# Patient Record
Sex: FEMALE | Race: BLACK OR AFRICAN AMERICAN | NOT HISPANIC OR LATINO
[De-identification: names, ages, dates, MRNs, and addresses within clinical notes are randomized per-mention and may not be internally consistent; named-entity substitution may affect disease eponyms.]

---

## 2017-09-11 ENCOUNTER — APPOINTMENT (OUTPATIENT)
Dept: ORTHOPEDIC SURGERY | Facility: CLINIC | Age: 57
End: 2017-09-11
Payer: COMMERCIAL

## 2017-09-11 VITALS
HEIGHT: 62.5 IN | BODY MASS INDEX: 31.4 KG/M2 | WEIGHT: 175 LBS | SYSTOLIC BLOOD PRESSURE: 114 MMHG | DIASTOLIC BLOOD PRESSURE: 76 MMHG | HEART RATE: 69 BPM

## 2017-09-11 PROCEDURE — 72110 X-RAY EXAM L-2 SPINE 4/>VWS: CPT

## 2017-09-11 PROCEDURE — 99214 OFFICE O/P EST MOD 30 MIN: CPT

## 2017-09-11 PROCEDURE — 72170 X-RAY EXAM OF PELVIS: CPT | Mod: 59

## 2019-02-17 ENCOUNTER — TRANSCRIPTION ENCOUNTER (OUTPATIENT)
Age: 59
End: 2019-02-17

## 2019-07-30 ENCOUNTER — EMERGENCY (EMERGENCY)
Facility: HOSPITAL | Age: 59
LOS: 0 days | Discharge: HOME | End: 2019-07-30
Attending: EMERGENCY MEDICINE
Payer: COMMERCIAL

## 2019-07-30 VITALS
WEIGHT: 177.91 LBS | TEMPERATURE: 98 F | RESPIRATION RATE: 15 BRPM | DIASTOLIC BLOOD PRESSURE: 73 MMHG | HEIGHT: 62 IN | OXYGEN SATURATION: 99 % | SYSTOLIC BLOOD PRESSURE: 122 MMHG | HEART RATE: 71 BPM

## 2019-07-30 VITALS
DIASTOLIC BLOOD PRESSURE: 71 MMHG | RESPIRATION RATE: 19 BRPM | SYSTOLIC BLOOD PRESSURE: 119 MMHG | HEART RATE: 61 BPM | TEMPERATURE: 98 F | OXYGEN SATURATION: 96 %

## 2019-07-30 DIAGNOSIS — R00.2 PALPITATIONS: ICD-10-CM

## 2019-07-30 DIAGNOSIS — Z90.49 ACQUIRED ABSENCE OF OTHER SPECIFIED PARTS OF DIGESTIVE TRACT: Chronic | ICD-10-CM

## 2019-07-30 LAB
ALBUMIN SERPL ELPH-MCNC: 3.8 G/DL — SIGNIFICANT CHANGE UP (ref 3.3–5)
ALP SERPL-CCNC: 65 U/L — SIGNIFICANT CHANGE UP (ref 40–120)
ALT FLD-CCNC: 20 U/L — SIGNIFICANT CHANGE UP (ref 12–78)
ANION GAP SERPL CALC-SCNC: 5 MMOL/L — SIGNIFICANT CHANGE UP (ref 5–17)
APTT BLD: 28.7 SEC — SIGNIFICANT CHANGE UP (ref 28.5–37)
AST SERPL-CCNC: 15 U/L — SIGNIFICANT CHANGE UP (ref 15–37)
BILIRUB SERPL-MCNC: 0.4 MG/DL — SIGNIFICANT CHANGE UP (ref 0.2–1.2)
BUN SERPL-MCNC: 11 MG/DL — SIGNIFICANT CHANGE UP (ref 7–23)
CALCIUM SERPL-MCNC: 9.1 MG/DL — SIGNIFICANT CHANGE UP (ref 8.5–10.1)
CHLORIDE SERPL-SCNC: 107 MMOL/L — SIGNIFICANT CHANGE UP (ref 96–108)
CO2 SERPL-SCNC: 29 MMOL/L — SIGNIFICANT CHANGE UP (ref 22–31)
CREAT SERPL-MCNC: 0.91 MG/DL — SIGNIFICANT CHANGE UP (ref 0.5–1.3)
D DIMER BLD IA.RAPID-MCNC: <150 NG/ML DDU — SIGNIFICANT CHANGE UP
GLUCOSE SERPL-MCNC: 87 MG/DL — SIGNIFICANT CHANGE UP (ref 70–99)
HCT VFR BLD CALC: 46.6 % — HIGH (ref 34.5–45)
HGB BLD-MCNC: 14.5 G/DL — SIGNIFICANT CHANGE UP (ref 11.5–15.5)
INR BLD: 0.99 RATIO — SIGNIFICANT CHANGE UP (ref 0.88–1.16)
MAGNESIUM SERPL-MCNC: 2.8 MG/DL — HIGH (ref 1.6–2.6)
MCHC RBC-ENTMCNC: 25.3 PG — LOW (ref 27–34)
MCHC RBC-ENTMCNC: 31.1 GM/DL — LOW (ref 32–36)
MCV RBC AUTO: 81.5 FL — SIGNIFICANT CHANGE UP (ref 80–100)
NRBC # BLD: 0 /100 WBCS — SIGNIFICANT CHANGE UP (ref 0–0)
PLATELET # BLD AUTO: 248 K/UL — SIGNIFICANT CHANGE UP (ref 150–400)
POTASSIUM SERPL-MCNC: 4.3 MMOL/L — SIGNIFICANT CHANGE UP (ref 3.5–5.3)
POTASSIUM SERPL-SCNC: 4.3 MMOL/L — SIGNIFICANT CHANGE UP (ref 3.5–5.3)
PROT SERPL-MCNC: 7.6 GM/DL — SIGNIFICANT CHANGE UP (ref 6–8.3)
PROTHROM AB SERPL-ACNC: 11.1 SEC — SIGNIFICANT CHANGE UP (ref 10–12.9)
RBC # BLD: 5.72 M/UL — HIGH (ref 3.8–5.2)
RBC # FLD: 14.6 % — HIGH (ref 10.3–14.5)
SODIUM SERPL-SCNC: 141 MMOL/L — SIGNIFICANT CHANGE UP (ref 135–145)
TROPONIN I SERPL-MCNC: <.015 NG/ML — SIGNIFICANT CHANGE UP (ref 0.01–0.04)
TROPONIN I SERPL-MCNC: <.015 NG/ML — SIGNIFICANT CHANGE UP (ref 0.01–0.04)
TSH SERPL-MCNC: 1.04 UIU/ML — SIGNIFICANT CHANGE UP (ref 0.36–3.74)
WBC # BLD: 7.41 K/UL — SIGNIFICANT CHANGE UP (ref 3.8–10.5)
WBC # FLD AUTO: 7.41 K/UL — SIGNIFICANT CHANGE UP (ref 3.8–10.5)

## 2019-07-30 PROCEDURE — 93010 ELECTROCARDIOGRAM REPORT: CPT | Mod: 76

## 2019-07-30 PROCEDURE — 71045 X-RAY EXAM CHEST 1 VIEW: CPT | Mod: 26

## 2019-07-30 PROCEDURE — 99285 EMERGENCY DEPT VISIT HI MDM: CPT

## 2019-07-30 RX ORDER — ASPIRIN/CALCIUM CARB/MAGNESIUM 324 MG
325 TABLET ORAL ONCE
Refills: 0 | Status: COMPLETED | OUTPATIENT
Start: 2019-07-30 | End: 2019-07-30

## 2019-07-30 RX ADMIN — Medication 325 MILLIGRAM(S): at 16:56

## 2019-07-30 NOTE — ED ADULT TRIAGE NOTE - CHIEF COMPLAINT QUOTE
palpitation sometime my chest  tight. 2 weeks ago she had these symptoms and she was seen by the doctor schedule for test tomorrow. She already had the heart monitor the results are pending. Today 1 pm she started feeling it again chest tightness and palpitations.

## 2019-07-30 NOTE — ED ADULT NURSE NOTE - CHPI ED NUR SYMPTOMS NEG
no fever/no back pain/no shortness of breath/no nausea/no syncope/no chest pain/no chills/no vomiting/no diaphoresis/no dizziness

## 2019-07-30 NOTE — ED ADULT NURSE NOTE - OBJECTIVE STATEMENT
c/o palpitation and chest  tightness  2 weeks ago which resolved.  Had appointment with MD tomorrow however chest tightness returned this morning.  Patient states she has had hematuria for years.  Denies SOB/fever/N/V/dysuria/diarrhea.   Denies PMH

## 2019-07-30 NOTE — ED PROVIDER NOTE - PHYSICAL EXAMINATION
Gen: Alert, Well appearing. NAD    Head: NC, AT, PERRL, normal lids/conjunctiva   ENT: Bilateral TM WNL, patent oropharynx without erythema/exudate, uvula midline  Neck: supple, no tenderness/meningismus  Pulm: Bilateral clear BS, normal resp effort  CV: RRR, no M/R/G, +dist pulses   Abd: soft, NT/ND, +BS, no guarding/rebound tenderness  Mskel:  no edema/erythema/cyanosis   Skin: no rash, no bruising  Neuro: AAOx3, no sensory/motor deficits, CN 2-12 intact

## 2019-07-30 NOTE — ED ADULT NURSE NOTE - NSIMPLEMENTINTERV_GEN_ALL_ED
Implemented All Universal Safety Interventions:  [place] to call system. Call bell, personal items and telephone within reach. Instruct patient to call for assistance. Room bathroom lighting operational. Non-slip footwear when patient is off stretcher. Physically safe environment: no spills, clutter or unnecessary equipment. Stretcher in lowest position, wheels locked, appropriate side rails in place.

## 2019-07-30 NOTE — ED PROVIDER NOTE - CLINICAL SUMMARY MEDICAL DECISION MAKING FREE TEXT BOX
pt nontoxic, well appearing, has appt for stress test tomorrow. pt on monitor without any arrythmias.

## 2020-01-15 ENCOUNTER — APPOINTMENT (OUTPATIENT)
Dept: ORTHOPEDIC SURGERY | Facility: CLINIC | Age: 60
End: 2020-01-15
Payer: COMMERCIAL

## 2020-01-15 VITALS
HEIGHT: 62.5 IN | WEIGHT: 176 LBS | HEART RATE: 67 BPM | BODY MASS INDEX: 31.58 KG/M2 | SYSTOLIC BLOOD PRESSURE: 114 MMHG | DIASTOLIC BLOOD PRESSURE: 74 MMHG

## 2020-01-15 DIAGNOSIS — M54.42 LUMBAGO WITH SCIATICA, LEFT SIDE: ICD-10-CM

## 2020-01-15 PROCEDURE — 72110 X-RAY EXAM L-2 SPINE 4/>VWS: CPT

## 2020-01-15 PROCEDURE — 72170 X-RAY EXAM OF PELVIS: CPT | Mod: 59

## 2020-01-15 PROCEDURE — 99214 OFFICE O/P EST MOD 30 MIN: CPT

## 2020-01-15 NOTE — PHYSICAL EXAM
[Normal] : normal [Limited] : is limited [LE] : 5/5 motor strength in bilateral lower extremities [Knee] : patellar 2+ and symmetric bilaterally [Ankle] : ankle 2+ and symmetric bilaterally [PT] : posterior tibial 2+ and symmetric bilaterally [DP] : dorsalis pedis 2+ and symmetric bilaterally [Poor Appearance] : well-appearing [Obese] : not obese [Acute Distress] : not in acute distress [Poor Coordination] : normal coordination [Abl Affect] : with normal affect [Abl Mood] : in a normal mood [Disorientation] : oriented x 3 [Painful] : not painful [SLR] : negative straight leg raise [FreeTextEntry2] : The pt is awake, alert and oriented to self, place and time, is uncomfortable and in no acute distress. Gait examination reveals a narrow based, non-ataxic, non-antalgic gait. Can heel and toe walk without difficulty. Inspection of neck, back and lower extremities bilaterally reveals no rashes or ecchymotic lesions.  There is no obvious abnormal spinal curvature in the sagittal and coronal planes. There is no tenderness over the cervical, thoracic  spine, or the upper and lower extremities musculature. Tenderness noted in the midline lumbar spine as well as Left paraspinal lumbar musculature. There is no sacroiliac tenderness. No greater trochanteric tenderness bilaterally. No atrophy or abnormal movements noted in the upper or lower extremities. There is no swelling noted in the upper or lower extremities bilaterally. No cervical lymphadenopathy noted anteriorly. No joint laxity noted in the upper and lower extremity joints bilaterally.\par There is no groin pain with hip internal rotation and a negative HILARY test bilaterally.  [de-identified] : healed lumbar incision [de-identified] : d extension of 30 degrees [de-identified] : 4 lumbar spine obtained today demonstrate no significant scoliosis. Mild degenerative changes noted in the sacroiliac joint right more than left. On the lateral projection grade 1 anterolisthesis at L4-5. Minimal loss of disc space and L5-S1. No dynamic instability noted between flexion and extension. No acute fractures identified.\par \par AP pelvis x-ray obtained today demonstrates normal appearance of the hips bilaterally. No acute fracture noted. No significant degenerative changes identified. Mild bilateral sacroiliac degeneration noted.\par \par No significant interval change since Xrays 2016

## 2020-01-15 NOTE — DISCUSSION/SUMMARY
[Medication Risks Reviewed] : Medication risks reviewed [de-identified] : The patient at this time reports acute onset of back pain with minimal left lumbar radiculopathy. This has been ongoing for 2 weeks. In the absence of any specific neurologic deficits there are no nerve tension signs I recommended physical therapy for her. Prescribed her baclofen and recommended use of ibuprofen 600 mg which she has at home. I will see her back in 4 weeks for reevaluation. Her symptoms persist or worsen MRI lumbar spine can be considered.\par \par The patient was educated regarding their condition, treatment options as well as prescribed course of treatment. \par Risks and benefits as well as alternatives to the proposed treatment were also provided to the patient \par They were given the opportunity to have all their questions answered to their satisfaction.\par \par Vital signs were reviewed with the patient and the patient was instructed to followup with their primary care provider for further management.\par \par Healthy lifestyle recommendations were also made including a tobacco free lifestyle, proper diet, and weight control.

## 2020-01-15 NOTE — HISTORY OF PRESENT ILLNESS
[Stable] : stable [5] : a current pain level of 5/10 [Daily] : ~He/She~ states the symptoms seem to be occuring daily [de-identified] : any type of activity [de-identified] : Patient is here today due to acute flare up in her left sided low back intermittent left leg pain started 2 weeks ago no injury and not medically treated for this episode. 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  [de-identified] : motrin 600mg

## 2020-01-15 NOTE — REASON FOR VISIT
[Herniated Discs] : herniated discs [Follow-Up Visit] : a follow-up visit for [Back Pain] : back pain [Radiculopathy] : radiculopathy [Sciatica] : sciatica [FreeTextEntry2] : Lumbar microdiskectomy 7/20/16

## 2020-05-28 ENCOUNTER — APPOINTMENT (OUTPATIENT)
Dept: PULMONOLOGY | Facility: CLINIC | Age: 60
End: 2020-05-28
Payer: COMMERCIAL

## 2020-05-28 VITALS
WEIGHT: 169 LBS | BODY MASS INDEX: 30.32 KG/M2 | RESPIRATION RATE: 18 BRPM | DIASTOLIC BLOOD PRESSURE: 81 MMHG | HEART RATE: 68 BPM | OXYGEN SATURATION: 98 % | SYSTOLIC BLOOD PRESSURE: 119 MMHG | HEIGHT: 62.5 IN | TEMPERATURE: 98.5 F

## 2020-05-28 PROCEDURE — 71046 X-RAY EXAM CHEST 2 VIEWS: CPT

## 2020-05-28 PROCEDURE — 36415 COLL VENOUS BLD VENIPUNCTURE: CPT

## 2020-05-28 PROCEDURE — 99244 OFF/OP CNSLTJ NEW/EST MOD 40: CPT | Mod: 25

## 2020-05-30 NOTE — REASON FOR VISIT
[Consultation] : a consultation [Chest Pain] : chest pain [Cough] : cough [Shortness of Breath] : shortness of breath

## 2020-05-30 NOTE — HISTORY OF PRESENT ILLNESS
[TextBox_4] : Dee is a pleasant 59-year-old female with a history of lumbar radiculopathy, status post lumbar spine laminectomy, she has been experiencing whitish productive cough for the last 2 months, also has shortness of breath and mild pleuritic chest pain, she states that she was diagnosed with COVID 19 infection in March of this year

## 2020-05-30 NOTE — ASSESSMENT
[FreeTextEntry1] : Venipuncture with labs drawn in office\par I am starting her on Breo 200/25 mcg Ellipta, one puff QD

## 2020-05-30 NOTE — CONSULT LETTER
[Dear  ___] : Dear  [unfilled], [Courtesy Letter:] : I had the pleasure of seeing your patient, [unfilled], in my office today. [Please see my note below.] : Please see my note below. [Referral Closing:] : Thank you very much for seeing this patient.  If you have any questions, please do not hesitate to contact me. [Sincerely,] : Sincerely, [FreeTextEntry3] : Dr. Karoline Mauricio

## 2020-05-30 NOTE — DISCUSSION/SUMMARY
[FreeTextEntry1] : Dee is a  patient with persistent cough and shortness of breath positive secondary to asthmatic bronchitis from the recent COVID 19 infection, rule out PE (unlikely)

## 2020-06-01 ENCOUNTER — TRANSCRIPTION ENCOUNTER (OUTPATIENT)
Age: 60
End: 2020-06-01

## 2020-06-02 LAB
ALBUMIN SERPL ELPH-MCNC: 4.1 G/DL
ALP BLD-CCNC: 63 U/L
ALT SERPL-CCNC: 14 U/L
ANION GAP SERPL CALC-SCNC: 12 MMOL/L
AST SERPL-CCNC: 22 U/L
BASOPHILS # BLD AUTO: 0.07 K/UL
BASOPHILS NFR BLD AUTO: 1.2 %
BILIRUB SERPL-MCNC: 0.4 MG/DL
BUN SERPL-MCNC: 15 MG/DL
CALCIUM SERPL-MCNC: 9.5 MG/DL
CHLORIDE SERPL-SCNC: 103 MMOL/L
CO2 SERPL-SCNC: 26 MMOL/L
CREAT SERPL-MCNC: 0.89 MG/DL
CRP SERPL-MCNC: 0.21 MG/DL
DEPRECATED D DIMER PPP IA-ACNC: <150 NG/ML DDU
EOSINOPHIL # BLD AUTO: 0.23 K/UL
EOSINOPHIL NFR BLD AUTO: 3.8 %
ERYTHROCYTE [SEDIMENTATION RATE] IN BLOOD BY WESTERGREN METHOD: 16 MM/HR
FERRITIN SERPL-MCNC: 74 NG/ML
GLUCOSE SERPL-MCNC: 87 MG/DL
HCT VFR BLD CALC: 43.3 %
HGB BLD-MCNC: 13.2 G/DL
IMM GRANULOCYTES NFR BLD AUTO: 0.2 %
LYMPHOCYTES # BLD AUTO: 1.95 K/UL
LYMPHOCYTES NFR BLD AUTO: 32.1 %
MAN DIFF?: NORMAL
MCHC RBC-ENTMCNC: 25.3 PG
MCHC RBC-ENTMCNC: 30.5 GM/DL
MCV RBC AUTO: 83.1 FL
MONOCYTES # BLD AUTO: 0.51 K/UL
MONOCYTES NFR BLD AUTO: 8.4 %
NEUTROPHILS # BLD AUTO: 3.3 K/UL
NEUTROPHILS NFR BLD AUTO: 54.3 %
PLATELET # BLD AUTO: 233 K/UL
POTASSIUM SERPL-SCNC: 4.3 MMOL/L
PROCALCITONIN SERPL-MCNC: 0.05 NG/ML
PROT SERPL-MCNC: 6.4 G/DL
RBC # BLD: 5.21 M/UL
RBC # FLD: 15.4 %
SODIUM SERPL-SCNC: 141 MMOL/L
TOTAL IGE SMQN RAST: 101 KU/L
WBC # FLD AUTO: 6.07 K/UL

## 2020-06-11 ENCOUNTER — APPOINTMENT (OUTPATIENT)
Dept: PULMONOLOGY | Facility: CLINIC | Age: 60
End: 2020-06-11
Payer: COMMERCIAL

## 2020-06-11 VITALS
OXYGEN SATURATION: 97 % | TEMPERATURE: 98.2 F | HEART RATE: 69 BPM | SYSTOLIC BLOOD PRESSURE: 109 MMHG | DIASTOLIC BLOOD PRESSURE: 74 MMHG

## 2020-06-11 PROCEDURE — 99214 OFFICE O/P EST MOD 30 MIN: CPT

## 2020-06-13 NOTE — PHYSICAL EXAM
[Normal Oropharynx] : normal oropharynx [No Acute Distress] : no acute distress [Normal Appearance] : normal appearance [No Neck Mass] : no neck mass [Normal Rate/Rhythm] : normal rate/rhythm [Normal S1, S2] : normal s1, s2 [No Murmurs] : no murmurs [No Abnormalities] : no abnormalities [No Resp Distress] : no resp distress [Clear to Auscultation Bilaterally] : clear to auscultation bilaterally [Benign] : benign [No Clubbing] : no clubbing [Normal Gait] : normal gait [No Edema] : no edema [No Cyanosis] : no cyanosis [Normal Color/ Pigmentation] : normal color/ pigmentation [FROM] : FROM [Oriented x3] : oriented x3 [No Focal Deficits] : no focal deficits [Normal Affect] : normal affect

## 2020-07-02 ENCOUNTER — APPOINTMENT (OUTPATIENT)
Dept: PULMONOLOGY | Facility: CLINIC | Age: 60
End: 2020-07-02
Payer: COMMERCIAL

## 2020-07-02 VITALS
WEIGHT: 172 LBS | OXYGEN SATURATION: 99 % | DIASTOLIC BLOOD PRESSURE: 75 MMHG | BODY MASS INDEX: 31.65 KG/M2 | HEART RATE: 59 BPM | SYSTOLIC BLOOD PRESSURE: 110 MMHG | HEIGHT: 62 IN | TEMPERATURE: 98.6 F

## 2020-07-02 PROCEDURE — 71046 X-RAY EXAM CHEST 2 VIEWS: CPT

## 2020-07-02 PROCEDURE — 99214 OFFICE O/P EST MOD 30 MIN: CPT | Mod: 25

## 2020-07-02 NOTE — PHYSICAL EXAM
[No Acute Distress] : no acute distress [Normal Oropharynx] : normal oropharynx [Normal Appearance] : normal appearance [No Neck Mass] : no neck mass [Normal Rate/Rhythm] : normal rate/rhythm [Normal S1, S2] : normal s1, s2 [No Murmurs] : no murmurs [No Resp Distress] : no resp distress [Clear to Auscultation Bilaterally] : clear to auscultation bilaterally [No Abnormalities] : no abnormalities [Benign] : benign [Normal Gait] : normal gait [No Clubbing] : no clubbing [No Cyanosis] : no cyanosis [No Edema] : no edema [FROM] : FROM [Normal Color/ Pigmentation] : normal color/ pigmentation [Oriented x3] : oriented x3 [No Focal Deficits] : no focal deficits [Normal Affect] : normal affect

## 2020-07-05 NOTE — PROCEDURE
[FreeTextEntry1] : Chest x-ray PA and lateral views performed in my office today showed several tiny nodules in right lower lobe, no evidence of infiltrates or pleural effusions.\par

## 2020-07-16 ENCOUNTER — APPOINTMENT (OUTPATIENT)
Dept: CT IMAGING | Facility: CLINIC | Age: 60
End: 2020-07-16
Payer: COMMERCIAL

## 2020-07-16 ENCOUNTER — OUTPATIENT (OUTPATIENT)
Dept: OUTPATIENT SERVICES | Facility: HOSPITAL | Age: 60
LOS: 1 days | End: 2020-07-16
Payer: COMMERCIAL

## 2020-07-16 DIAGNOSIS — Z00.8 ENCOUNTER FOR OTHER GENERAL EXAMINATION: ICD-10-CM

## 2020-07-16 DIAGNOSIS — Z90.49 ACQUIRED ABSENCE OF OTHER SPECIFIED PARTS OF DIGESTIVE TRACT: Chronic | ICD-10-CM

## 2020-07-16 PROCEDURE — 71250 CT THORAX DX C-: CPT

## 2020-07-16 PROCEDURE — 71250 CT THORAX DX C-: CPT | Mod: 26

## 2020-08-04 ENCOUNTER — APPOINTMENT (OUTPATIENT)
Dept: PULMONOLOGY | Facility: CLINIC | Age: 60
End: 2020-08-04
Payer: COMMERCIAL

## 2020-08-04 VITALS
OXYGEN SATURATION: 96 % | HEART RATE: 61 BPM | BODY MASS INDEX: 31.28 KG/M2 | TEMPERATURE: 98.3 F | WEIGHT: 170 LBS | DIASTOLIC BLOOD PRESSURE: 71 MMHG | HEIGHT: 62 IN | SYSTOLIC BLOOD PRESSURE: 111 MMHG

## 2020-08-04 PROCEDURE — 99214 OFFICE O/P EST MOD 30 MIN: CPT

## 2020-08-04 NOTE — PHYSICAL EXAM
[No Acute Distress] : no acute distress [Normal Appearance] : normal appearance [Normal Oropharynx] : normal oropharynx [No Neck Mass] : no neck mass [Normal Rate/Rhythm] : normal rate/rhythm [Normal S1, S2] : normal s1, s2 [No Murmurs] : no murmurs [No Resp Distress] : no resp distress [Clear to Auscultation Bilaterally] : clear to auscultation bilaterally [No Abnormalities] : no abnormalities [Benign] : benign [Normal Gait] : normal gait [No Clubbing] : no clubbing [No Cyanosis] : no cyanosis [No Edema] : no edema [FROM] : FROM [Normal Color/ Pigmentation] : normal color/ pigmentation [No Focal Deficits] : no focal deficits [Oriented x3] : oriented x3 [Normal Affect] : normal affect

## 2020-08-05 NOTE — ASSESSMENT
[FreeTextEntry1] : continue Breo\par Prednisone/Zpak\par Get noncontrast chest CT scan for f/u in 3 months

## 2020-08-18 ENCOUNTER — APPOINTMENT (OUTPATIENT)
Dept: PULMONOLOGY | Facility: CLINIC | Age: 60
End: 2020-08-18
Payer: COMMERCIAL

## 2020-08-18 VITALS
SYSTOLIC BLOOD PRESSURE: 110 MMHG | DIASTOLIC BLOOD PRESSURE: 74 MMHG | OXYGEN SATURATION: 94 % | RESPIRATION RATE: 16 BRPM | HEART RATE: 58 BPM | TEMPERATURE: 98.4 F

## 2020-08-18 PROCEDURE — 99214 OFFICE O/P EST MOD 30 MIN: CPT

## 2020-08-18 NOTE — ASSESSMENT
[FreeTextEntry1] : continue Breo\par Check pulmonary function test in 1 month\par Get noncontrast chest CT scan for f/u in 2 months

## 2020-08-18 NOTE — REASON FOR VISIT
[Asthma] : asthma [Shortness of Breath] : shortness of breath [TextBox_44] : SOB is better [Follow-Up] : a follow-up visit

## 2020-09-10 DIAGNOSIS — Z01.818 ENCOUNTER FOR OTHER PREPROCEDURAL EXAMINATION: ICD-10-CM

## 2020-09-11 ENCOUNTER — APPOINTMENT (OUTPATIENT)
Dept: DISASTER EMERGENCY | Facility: CLINIC | Age: 60
End: 2020-09-11

## 2020-09-15 ENCOUNTER — APPOINTMENT (OUTPATIENT)
Dept: PULMONOLOGY | Facility: CLINIC | Age: 60
End: 2020-09-15
Payer: COMMERCIAL

## 2020-09-15 VITALS
RESPIRATION RATE: 14 BRPM | HEART RATE: 54 BPM | WEIGHT: 172 LBS | OXYGEN SATURATION: 99 % | BODY MASS INDEX: 31.65 KG/M2 | HEIGHT: 62 IN | SYSTOLIC BLOOD PRESSURE: 120 MMHG | DIASTOLIC BLOOD PRESSURE: 84 MMHG

## 2020-09-15 PROCEDURE — 99214 OFFICE O/P EST MOD 30 MIN: CPT | Mod: 25

## 2020-09-15 PROCEDURE — 94727 GAS DIL/WSHOT DETER LNG VOL: CPT

## 2020-09-15 PROCEDURE — 94010 BREATHING CAPACITY TEST: CPT

## 2020-09-15 PROCEDURE — ZZZZZ: CPT

## 2020-09-15 PROCEDURE — 94729 DIFFUSING CAPACITY: CPT

## 2020-09-15 PROCEDURE — 88738 HGB QUANT TRANSCUTANEOUS: CPT

## 2020-09-16 LAB — SARS-COV-2 N GENE NPH QL NAA+PROBE: NOT DETECTED

## 2020-09-16 NOTE — REASON FOR VISIT
[Follow-Up] : a follow-up visit [Abnormal CXR/ Chest CT] : an abnormal CXR/ chest CT [Cough] : cough [Hypersomnolence] : hypersomnolence [TextBox_44] : Complains of excessive daytime sleepiness and snoring

## 2020-09-16 NOTE — PROCEDURE
[FreeTextEntry1] : Pulmonary Function Test: Lung Volume: Within normal limits; Spirometry: Within normal limits; Diffusion: Within normal limits.\par

## 2020-09-16 NOTE — ASSESSMENT
[FreeTextEntry1] : Continue Breo\par Repeat chest CT scan for f/u\par Get home sleep study to definitively rule out TARAN

## 2020-10-02 ENCOUNTER — APPOINTMENT (OUTPATIENT)
Dept: PULMONOLOGY | Facility: CLINIC | Age: 60
End: 2020-10-02
Payer: COMMERCIAL

## 2020-10-02 PROCEDURE — 95800 SLP STDY UNATTENDED: CPT

## 2020-10-04 PROCEDURE — 95800 SLP STDY UNATTENDED: CPT

## 2020-10-22 ENCOUNTER — APPOINTMENT (OUTPATIENT)
Dept: CT IMAGING | Facility: IMAGING CENTER | Age: 60
End: 2020-10-22
Payer: COMMERCIAL

## 2020-10-22 ENCOUNTER — OUTPATIENT (OUTPATIENT)
Dept: OUTPATIENT SERVICES | Facility: HOSPITAL | Age: 60
LOS: 1 days | End: 2020-10-22
Payer: COMMERCIAL

## 2020-10-22 DIAGNOSIS — R91.8 OTHER NONSPECIFIC ABNORMAL FINDING OF LUNG FIELD: ICD-10-CM

## 2020-10-22 DIAGNOSIS — Z90.49 ACQUIRED ABSENCE OF OTHER SPECIFIED PARTS OF DIGESTIVE TRACT: Chronic | ICD-10-CM

## 2020-10-22 PROCEDURE — 71250 CT THORAX DX C-: CPT | Mod: 26

## 2020-10-22 PROCEDURE — 71250 CT THORAX DX C-: CPT

## 2020-10-27 ENCOUNTER — APPOINTMENT (OUTPATIENT)
Dept: PULMONOLOGY | Facility: CLINIC | Age: 60
End: 2020-10-27
Payer: COMMERCIAL

## 2020-10-27 VITALS
SYSTOLIC BLOOD PRESSURE: 117 MMHG | TEMPERATURE: 98.2 F | DIASTOLIC BLOOD PRESSURE: 81 MMHG | HEART RATE: 59 BPM | OXYGEN SATURATION: 96 % | RESPIRATION RATE: 15 BRPM

## 2020-10-27 DIAGNOSIS — G47.33 OBSTRUCTIVE SLEEP APNEA (ADULT) (PEDIATRIC): ICD-10-CM

## 2020-10-27 DIAGNOSIS — R06.83 SNORING: ICD-10-CM

## 2020-10-27 PROCEDURE — 99215 OFFICE O/P EST HI 40 MIN: CPT

## 2020-10-27 PROCEDURE — 99072 ADDL SUPL MATRL&STAF TM PHE: CPT

## 2020-10-28 PROBLEM — G47.33 OSA (OBSTRUCTIVE SLEEP APNEA): Status: ACTIVE | Noted: 2020-10-28

## 2020-10-28 NOTE — DISCUSSION/SUMMARY
[FreeTextEntry1] : Improving bilateral opacities likely secondary to sequela of COVID-19 infection.  Mild obstructive sleep apnea

## 2020-10-28 NOTE — PROCEDURE
[FreeTextEntry1] : \par   CT Chest No Cont             Final\par \par No Documents Attached\par \par \par \par \par   EXAM:  CT CHEST\par \par \par PROCEDURE DATE:  10/22/2020\par \par \par \par INTERPRETATION:  CLINICAL INFORMATION: Bilateral groundglass opacities on prior chest CT. Follow-up study.\par \par COMPARISON: CT chest 7/16/2020.\par \par PROCEDURE:\par CT of the Chest was performed without intravenous contrast.\par Sagittal and coronal reformats were performed.\par \par FINDINGS:\par \par LUNGS AND AIRWAYS: No endobronchial lesions. Slight decrease in the peripheral bilateral groundglass opacities .\par \par PLEURA: No pleural effusion.\par \par MEDIASTINUM AND PELON: No lymphadenopathy.\par \par HEART: Heart size is normal. No pericardial effusion. Aortic valve calcification.\par \par CHEST WALL AND LOWER NECK: Within normal limits.\par \par VISUALIZED UPPER ABDOMEN: Scattered hyperdense foci within the liver. Colonic diverticulosis.\par \par BONES: Within normal limits.\par \par IMPRESSION:\par \par Slight decrease in the peripheral bilateral groundglass opacities\par \par \par \par \par \par KENNEDY MARTIN M.D., RADIOLOGY RESIDENT\par This document has been electronically signed.\par ALEX GOODWIN MD; Attending Radiologist\par This document has been electronically signed. Oct 22 2020  7:31PM\par \par  \par \par  Ordered by: MAGALIS العلي       Collected/Examined: 22Oct2020 12:18PM       \par Verification Required       Stage: Final       \par  Performed at: [facility]       Resulted: 22Oct2020 07:34PM       Last Updated: 22Oct2020 07:34PM       Accession: [id number]       \par \par Home sleep study showed mild sleep apnea

## 2020-10-28 NOTE — ASSESSMENT
[FreeTextEntry1] : Continue Breo\par Repeat chest CT scan for f/u in 6 months\par Discussed with patient at length regarding the aforementioned sleep study findings and all treatment options in the office today, will refer Dee to Dr. Hernandez for oral appliance evaluation.

## 2020-12-08 ENCOUNTER — APPOINTMENT (OUTPATIENT)
Dept: PULMONOLOGY | Facility: CLINIC | Age: 60
End: 2020-12-08
Payer: COMMERCIAL

## 2020-12-08 VITALS
HEART RATE: 59 BPM | OXYGEN SATURATION: 98 % | DIASTOLIC BLOOD PRESSURE: 68 MMHG | SYSTOLIC BLOOD PRESSURE: 102 MMHG | TEMPERATURE: 97.6 F | RESPIRATION RATE: 15 BRPM

## 2020-12-08 DIAGNOSIS — R07.89 OTHER CHEST PAIN: ICD-10-CM

## 2020-12-08 PROCEDURE — 99214 OFFICE O/P EST MOD 30 MIN: CPT | Mod: 25

## 2020-12-08 PROCEDURE — 99072 ADDL SUPL MATRL&STAF TM PHE: CPT

## 2020-12-08 PROCEDURE — 71046 X-RAY EXAM CHEST 2 VIEWS: CPT

## 2020-12-08 RX ORDER — AZITHROMYCIN 250 MG/1
250 TABLET, FILM COATED ORAL
Qty: 6 | Refills: 0 | Status: DISCONTINUED | COMMUNITY
Start: 2020-08-04 | End: 2020-12-08

## 2020-12-08 RX ORDER — PREDNISONE 10 MG/1
10 TABLET ORAL
Qty: 18 | Refills: 0 | Status: DISCONTINUED | COMMUNITY
Start: 2020-08-04 | End: 2020-12-08

## 2020-12-08 NOTE — REASON FOR VISIT
[Follow-Up] : a follow-up visit [Asthma] : asthma [Cough] : cough [TextBox_44] : Complains of mild cough

## 2020-12-08 NOTE — HISTORY OF PRESENT ILLNESS
[TextBox_4] : Remains on Breo inhaler daily\par Complaints of occasional dry cough, and some dyspnea on exertion post covid infection\par Did not f/u with Dr. Hernandez, did not feel she needs sleep appliance

## 2020-12-08 NOTE — PROCEDURE
[FreeTextEntry1] : \par  Xray Chest 2 Views PA/Lat             Final\par \par No Documents Attached\par \par \par \par \par   \par Chest x-ray PA and lateral views performed in my office today showed clear lungs, no evidence of infiltrates or pleural effusions. \par \par \par  Ordered by: MAGALIS العلي       Collected/Examined: [id number] 09:59PM       \par Verification Required       Stage: Final       \par  Performed at: In Office       Performed by: MAGALIS العلي       Resulted: [id number] 09:59PM       Last Updated: [id number] 10:00PM

## 2020-12-31 ENCOUNTER — APPOINTMENT (OUTPATIENT)
Dept: DISASTER EMERGENCY | Facility: CLINIC | Age: 60
End: 2020-12-31

## 2021-01-01 LAB — SARS-COV-2 N GENE NPH QL NAA+PROBE: NOT DETECTED

## 2021-01-05 ENCOUNTER — APPOINTMENT (OUTPATIENT)
Dept: PULMONOLOGY | Facility: CLINIC | Age: 61
End: 2021-01-05
Payer: COMMERCIAL

## 2021-01-05 VITALS
HEART RATE: 59 BPM | OXYGEN SATURATION: 97 % | SYSTOLIC BLOOD PRESSURE: 110 MMHG | RESPIRATION RATE: 14 BRPM | TEMPERATURE: 98.1 F | DIASTOLIC BLOOD PRESSURE: 74 MMHG

## 2021-01-05 LAB — POCT - HEMOGLOBIN (HGB), QUANTITATIVE, TRANSCUTANEOUS: 13.7

## 2021-01-05 PROCEDURE — 94010 BREATHING CAPACITY TEST: CPT

## 2021-01-05 PROCEDURE — 99214 OFFICE O/P EST MOD 30 MIN: CPT | Mod: 25

## 2021-01-05 PROCEDURE — 94727 GAS DIL/WSHOT DETER LNG VOL: CPT

## 2021-01-05 PROCEDURE — 88738 HGB QUANT TRANSCUTANEOUS: CPT

## 2021-01-05 PROCEDURE — 94729 DIFFUSING CAPACITY: CPT

## 2021-01-05 PROCEDURE — ZZZZZ: CPT

## 2021-01-05 PROCEDURE — 99072 ADDL SUPL MATRL&STAF TM PHE: CPT

## 2021-01-05 NOTE — ASSESSMENT
[FreeTextEntry1] : Start Zpak and Prednisone taper\par Continue Breo\par Oral appliance evaluation with Dr. Hernandez

## 2021-02-09 ENCOUNTER — APPOINTMENT (OUTPATIENT)
Dept: PULMONOLOGY | Facility: CLINIC | Age: 61
End: 2021-02-09

## 2021-04-12 ENCOUNTER — APPOINTMENT (OUTPATIENT)
Dept: ORTHOPEDIC SURGERY | Facility: CLINIC | Age: 61
End: 2021-04-12
Payer: COMMERCIAL

## 2021-04-12 VITALS
SYSTOLIC BLOOD PRESSURE: 118 MMHG | HEIGHT: 62 IN | TEMPERATURE: 97.3 F | WEIGHT: 179 LBS | HEART RATE: 57 BPM | BODY MASS INDEX: 32.94 KG/M2 | DIASTOLIC BLOOD PRESSURE: 81 MMHG

## 2021-04-12 PROCEDURE — 72170 X-RAY EXAM OF PELVIS: CPT | Mod: 59

## 2021-04-12 PROCEDURE — 99214 OFFICE O/P EST MOD 30 MIN: CPT

## 2021-04-12 PROCEDURE — 72110 X-RAY EXAM L-2 SPINE 4/>VWS: CPT

## 2021-04-12 PROCEDURE — 99072 ADDL SUPL MATRL&STAF TM PHE: CPT

## 2021-04-12 RX ORDER — ATORVASTATIN CALCIUM 10 MG/1
10 TABLET, FILM COATED ORAL
Qty: 90 | Refills: 0 | Status: ACTIVE | COMMUNITY
Start: 2020-12-29

## 2021-04-12 RX ORDER — FOLIC ACID 1 MG/1
1 TABLET ORAL
Qty: 30 | Refills: 0 | Status: ACTIVE | COMMUNITY
Start: 2020-09-21

## 2021-04-12 NOTE — HISTORY OF PRESENT ILLNESS
[Worsening] : worsening [___ mths] : [unfilled] month(s) ago [0] : a current pain level of 0/10 [8] : an average pain level of 8/10 [Bending] : worsened by bending [Lifting] : worsened by lifting [Prolonged Sitting] : worsened by prolonged sitting [Sitting] : worsened by sitting [Standing] : worsened by standing [Walking] : worsened by walking [Acetaminophen] : relieved by acetaminophen [de-identified] : Patient presents today with daily stiffness to lower back x 1 month; and complains of intermittent pain.  Patient states she feels "heavy."  No known injury.  \par Had COVID in 3/2020 for over 28 days, not hospitalized. \par Works as a nurse\par Primarily low back stiffness more than pain, does not have leg pain\par After last visit 1/15/20 took baclofen and ibupfrofen as well as went for PT [de-identified] : Aleve, topical ointments, laying down

## 2021-04-12 NOTE — PHYSICAL EXAM
[Normal] : normal [Limited] : is limited [LE] : Sensory: Intact in bilateral lower extremities [Knee] : patellar 2+ and symmetric bilaterally [Ankle] : ankle 2+ and symmetric bilaterally [DP] : dorsalis pedis 2+ and symmetric bilaterally [PT] : posterior tibial 2+ and symmetric bilaterally [Poor Appearance] : well-appearing [Acute Distress] : not in acute distress [Obese] : not obese [Abl Mood] : in a normal mood [Abl Affect] : with normal affect [Poor Coordination] : normal coordination [Disorientation] : oriented x 3 [Painful] : not painful [SLR] : negative straight leg raise [FreeTextEntry2] : The pt is awake, alert and oriented to self, place and time, is uncomfortable and in no acute distress. Gait examination reveals a narrow based, non-ataxic, non-antalgic gait. Can heel and toe walk without difficulty. Inspection of neck, back and lower extremities bilaterally reveals no rashes or ecchymotic lesions.  There is no obvious abnormal spinal curvature in the sagittal and coronal planes. There is no tenderness over the cervical, thoracic  spine, or the upper and lower extremities musculature. Tenderness noted in the midline lumbar spine as well as Left paraspinal lumbar musculature. There is no sacroiliac tenderness. No greater trochanteric tenderness bilaterally. No atrophy or abnormal movements noted in the upper or lower extremities. There is no swelling noted in the upper or lower extremities bilaterally. No cervical lymphadenopathy noted anteriorly. No joint laxity noted in the upper and lower extremity joints bilaterally.\par There is no groin pain with hip internal rotation and a negative HILARY test bilaterally.  [de-identified] : d extension of 30 degrees [de-identified] : healed lumbar incision [de-identified] : 4 lumbar spine obtained today demonstrate no significant scoliosis. Mild degenerative changes noted in the sacroiliac joint right more than left. On the lateral projection grade 1 anterolisthesis at L4-5. Minimal loss of disc space and L5-S1. No dynamic instability noted between flexion and extension. No acute fractures identified.\par \par AP pelvis x-ray obtained today demonstrates normal appearance of the hips bilaterally. No acute fracture noted. No significant degenerative changes identified. Mild bilateral sacroiliac degeneration noted.\par \par No significant interval change since Xrays 1/2020

## 2021-04-12 NOTE — DISCUSSION/SUMMARY
[Medication Risks Reviewed] : Medication risks reviewed [de-identified] : The patient reports primarily lumbar spine stiffness without radicular pain.  Preoperative left leg symptoms have resolved since her microdiscectomy on the left side L4-5 2016.  Continues to work as a nurse.  She was affected quite significantly by Covid in March 2020 and had symptoms for almost a month but did not require hospitalization.  At this time she has symptoms consistent with mild spinal listhesis L4-5 mild degeneration at that level with loss of disc height also at L5-S1 which were the 2 operative levels on July 15, 2016.  In this setting recommended a self-directed exercise program as well as formal physical therapy.  Recommended MRI lumbar spine to further evaluate any discogenic changes and source of pain.  Prescribed her tizanidine as a muscle relaxant.  She is not interested in prescription medications but may take Tylenol or Advil on as-needed basis.  I will see her back after the MRI to discuss further treatment\par \par The patient was educated regarding their condition, treatment options as well as prescribed course of treatment. \par Risks and benefits as well as alternatives to the proposed treatment were also provided to the patient \par They were given the opportunity to have all their questions answered to their satisfaction.\par \par Vital signs were reviewed with the patient and the patient was instructed to followup with their primary care provider for further management.\par \par Healthy lifestyle recommendations were also made including a tobacco free lifestyle, proper diet, and weight control.

## 2021-04-27 ENCOUNTER — OUTPATIENT (OUTPATIENT)
Dept: OUTPATIENT SERVICES | Facility: HOSPITAL | Age: 61
LOS: 1 days | End: 2021-04-27
Payer: COMMERCIAL

## 2021-04-27 ENCOUNTER — APPOINTMENT (OUTPATIENT)
Dept: MRI IMAGING | Facility: CLINIC | Age: 61
End: 2021-04-27
Payer: COMMERCIAL

## 2021-04-27 DIAGNOSIS — Z98.890 OTHER SPECIFIED POSTPROCEDURAL STATES: ICD-10-CM

## 2021-04-27 DIAGNOSIS — Z00.8 ENCOUNTER FOR OTHER GENERAL EXAMINATION: ICD-10-CM

## 2021-04-27 DIAGNOSIS — M43.10 SPONDYLOLISTHESIS, SITE UNSPECIFIED: ICD-10-CM

## 2021-04-27 DIAGNOSIS — Z90.49 ACQUIRED ABSENCE OF OTHER SPECIFIED PARTS OF DIGESTIVE TRACT: Chronic | ICD-10-CM

## 2021-04-27 DIAGNOSIS — M51.37 OTHER INTERVERTEBRAL DISC DEGENERATION, LUMBOSACRAL REGION: ICD-10-CM

## 2021-04-27 DIAGNOSIS — M54.9 DORSALGIA, UNSPECIFIED: ICD-10-CM

## 2021-04-27 PROCEDURE — 72148 MRI LUMBAR SPINE W/O DYE: CPT

## 2021-04-27 PROCEDURE — 72148 MRI LUMBAR SPINE W/O DYE: CPT | Mod: 26

## 2021-04-29 ENCOUNTER — APPOINTMENT (OUTPATIENT)
Dept: PULMONOLOGY | Facility: CLINIC | Age: 61
End: 2021-04-29
Payer: COMMERCIAL

## 2021-04-29 VITALS
DIASTOLIC BLOOD PRESSURE: 71 MMHG | TEMPERATURE: 97.9 F | SYSTOLIC BLOOD PRESSURE: 106 MMHG | OXYGEN SATURATION: 98 % | HEART RATE: 55 BPM

## 2021-04-29 DIAGNOSIS — U07.1 COVID-19: ICD-10-CM

## 2021-04-29 PROCEDURE — 99072 ADDL SUPL MATRL&STAF TM PHE: CPT

## 2021-04-29 PROCEDURE — 99214 OFFICE O/P EST MOD 30 MIN: CPT

## 2021-05-01 PROBLEM — U07.1 COVID-19 VIRUS INFECTION: Status: ACTIVE | Noted: 2020-05-28

## 2021-05-01 NOTE — ASSESSMENT
[FreeTextEntry1] : Continue Breo Ellipta.\par Repeat chest CT scan for follow-up in 6 months.\par Repeat pulmonary function test for follow-up in 3 months.

## 2021-05-14 ENCOUNTER — APPOINTMENT (OUTPATIENT)
Dept: ORTHOPEDIC SURGERY | Facility: CLINIC | Age: 61
End: 2021-05-14
Payer: COMMERCIAL

## 2021-05-14 VITALS
SYSTOLIC BLOOD PRESSURE: 105 MMHG | DIASTOLIC BLOOD PRESSURE: 71 MMHG | BODY MASS INDEX: 32.74 KG/M2 | TEMPERATURE: 97.3 F | WEIGHT: 179 LBS | HEART RATE: 62 BPM

## 2021-05-14 DIAGNOSIS — G89.29 DORSALGIA, UNSPECIFIED: ICD-10-CM

## 2021-05-14 DIAGNOSIS — M54.9 DORSALGIA, UNSPECIFIED: ICD-10-CM

## 2021-05-14 PROCEDURE — 99214 OFFICE O/P EST MOD 30 MIN: CPT

## 2021-05-14 PROCEDURE — 99072 ADDL SUPL MATRL&STAF TM PHE: CPT

## 2021-05-14 NOTE — PHYSICAL EXAM
[Normal] : normal [Limited] : is limited [LE] : Sensory: Intact in bilateral lower extremities [Knee] : patellar 2+ and symmetric bilaterally [Ankle] : ankle 2+ and symmetric bilaterally [DP] : dorsalis pedis 2+ and symmetric bilaterally [PT] : posterior tibial 2+ and symmetric bilaterally [de-identified] : EXAM: MR SPINE LUMBAR\par \par PROCEDURE DATE: 04/27/2021\par \par INTERPRETATION: MR LUMBAR SPINE\par \par CLINICAL INFORMATION: Low back pain and stiffness for one month. Status post discectomy 2016.\par TECHNIQUE: Multiplanar, multisequence MR imaging was obtained of the lumbar spine without intravenous contrast. Motion artifact degrades some of the sequences.\par COMPARISON: MRI lumbar spine dated 7/3/2016.\par \par FINDINGS:\par \par SPINAL ALIGNMENT: Mild dextrocurvature. Trace retrolisthesis of L5 on S1.\par DISTAL CORD AND CONUS: Conus ends at the level of L1. 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 Scattered nerve root sleeve cysts are noted.\par SI JOINTS: Unremarkable.\par MARROW: Small acute Schmorl's nodes are seen at the superior endplate of L5 and the superior endplate of S1.\par PARASPINAL MUSCLE AND SOFT TISSUES: Postsurgical changes at L4/L5.\par INTRAABDOMINAL/INTRAPELVIC SOFT TISSUES: Small bilateral renal cysts are noted.\par \par DISC LEVEL EVALUATION: Degenerative loss of signal and height of the L4/L5 and L5/S1 disc.\par \par T11/T12: Only evaluated in the sagittal plane. There is moderate right facet arthrosis. There is a small left-sided nerve root sleeve cyst. There is moderate right neural foraminal narrowing. There is no central canal or left-sided neural foraminal narrowing. Similar to prior.\par T12/L1: Only evaluated in the sagittal plane. No spinal canal stenosis or neural foraminal narrowing.\par L1/L2: No spinal canal stenosis or neural foraminal narrowing. Lateral recesses are preserved.\par L2/L3: Mild bilateral facet arthrosis. No spinal canal stenosis or neural foraminal narrowing. Lateral recesses are preserved.\par L3/L4: Mild disc bulge flattening of the ventral thecal sac and minimally narrowing the lateral recesses. Moderate bilateral facet arthrosis and ligamentum flavum thickening. Combination results in mild spinal canal stenosis and mild bilateral foraminal narrowing. Similar to prior.\par L4/L5: Diffuse disc bulge flattening the ventral thecal sac and minimally narrowing the right greater than left lateral recesses with contact on the descending bilateral L5 nerve roots. Moderate bilateral facet arthrosis and ligamentum flavum thickening. Combination results in severe spinal canal stenosis and mild to moderate bilateral foraminal narrowing. These findings are grossly unchanged. L5 superior endplate Schmorl's node with surrounding edema is new compared to prior.\par L5/S1: There is a diffuse disc bulge which is asymmetric to the left. There is complete effacement of the left lateral recess which may be related to granulation tissue or a recurrent disc. This results in mass effect upon the descending left S1 nerve root and appears grossly similar compared to the prior examination. Moderate bilateral facet arthrosis. Mild spinal canal stenosis. There is unchanged mild right and mild to moderate left neural foraminal narrowing.\par \par IMPRESSION:\par \par Multilevel lumbar spondylosis.\par \par L5/S1: There is a diffuse disc bulge which is asymmetric to the left. There is complete effacement left lateral recess related to granulation tissue or recurrent disc. If indicated a contrast-enhanced MRI may be helpful in differentiating. This results in mass effect upon the descending left S1 nerve root appears grossly similar compared to the prior examination. There is unchanged mild right and mild to moderate left neural foraminal narrowing.\par \par L4/L5: There is severe spinal canal stenosis and mild to moderate bilateral foraminal narrowing. These findings are grossly unchanged.\par \par Small acute appearing Schmorl's nodes along the superior endplate of L5 and the superior endplate of S1 is new since the prior.\par \par Findings otherwise grossly unchanged as above.\par \par DAX KIM MD; Fellow Radiology\par This document has been electronically signed.\par JOURDAN JAIMES MD; Attending Radiologist\par This document has been electronically signed. Apr 29 2021 4:38PM [Poor Appearance] : well-appearing [Acute Distress] : not in acute distress [Obese] : not obese [Abl Mood] : in a normal mood [Abl Affect] : with normal affect [Poor Coordination] : normal coordination [Disorientation] : oriented x 3 [Painful] : not painful [SLR] : negative straight leg raise [FreeTextEntry2] : The pt is awake, alert and oriented to self, place and time, is uncomfortable and in no acute distress. Gait examination reveals a narrow based, non-ataxic, non-antalgic gait. Can heel and toe walk without difficulty. Inspection of neck, back and lower extremities bilaterally reveals no rashes or ecchymotic lesions.  There is no obvious abnormal spinal curvature in the sagittal and coronal planes. There is no tenderness over the cervical, thoracic  spine, or the upper and lower extremities musculature. Tenderness noted in the midline lumbar spine as well as Left paraspinal lumbar musculature. There is no sacroiliac tenderness. No greater trochanteric tenderness bilaterally. No atrophy or abnormal movements noted in the upper or lower extremities. There is no swelling noted in the upper or lower extremities bilaterally. No cervical lymphadenopathy noted anteriorly. No joint laxity noted in the upper and lower extremity joints bilaterally.\par There is no groin pain with hip internal rotation and a negative HILARY test bilaterally.  [de-identified] : d extension of 30 degrees [de-identified] : healed lumbar incision

## 2021-05-14 NOTE — DISCUSSION/SUMMARY
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  She has stand and walk but no patient positioning responsibilities.  At this time recommended she continue to work as tolerated.  A prescription of physical therapy was provided.  We discussed alternative treatments including decompression therapy through chiropractors as well as trial of acupuncture if her insurance covers it.  Recommended she take the tizanidine as tolerated.  She has reported some headaches with use of that medication which if they persist she can discontinue the tizanidine and we can call in a different muscle relaxant for her.  Strongly recommended anti-inflammatory medication as well which she will continue use of over-the-counter Aleve or Advil.  We discussed the option of lumbar epidural steroid injection bilateral at the L4 level if her symptoms persist and she will consider that in the future as well.  She understands that the MRI reveals severe spinal stenosis at L4-5 which may be contributing to her symptoms so has disc degeneration L5-S1 and she may potentially benefit from a laminectomy and fusion from L4-S1 though her radiographs show mild findings at this time.  We would like to avoid surgery as much as possible.\par I will see her back in 2 to 3 months on as-needed basis.  If she is unable to continue to work as a nurse some time off say 2 to 4 weeks for medical care and rehabilitation may be appropriate for her.\par \par The patient was educated regarding their condition, treatment options as well as prescribed course of treatment. \par Risks and benefits as well as alternatives to the proposed treatment were also provided to the patient \par They were given the opportunity to have all their questions answered to their satisfaction.\par \par Vital signs were reviewed with the patient and the patient was instructed to followup with their primary care provider for further management.\par \par Healthy lifestyle recommendations were also made including a tobacco free lifestyle, proper diet, and weight control.

## 2021-05-14 NOTE — HISTORY OF PRESENT ILLNESS
[Improving] : improving [0] : a current pain level of 0/10 [Intermit.] : ~He/She~ states the symptoms seem to be intermittent [de-identified] : Patient presents today to discuss MRI results.  Patient denies pain but complains of stiffness to low back.  Patient states the stiffness has improved since last visit. 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 No PT yet.\calli s/p left L4-5 laminectomy, L5-S1 discectomy on 7/14/16 [de-identified] : Advil and muscle relaxant

## 2021-08-05 ENCOUNTER — APPOINTMENT (OUTPATIENT)
Dept: PULMONOLOGY | Facility: CLINIC | Age: 61
End: 2021-08-05
Payer: COMMERCIAL

## 2021-08-05 VITALS
OXYGEN SATURATION: 100 % | BODY MASS INDEX: 32.76 KG/M2 | HEIGHT: 62 IN | SYSTOLIC BLOOD PRESSURE: 106 MMHG | DIASTOLIC BLOOD PRESSURE: 75 MMHG | TEMPERATURE: 97.3 F | HEART RATE: 60 BPM | RESPIRATION RATE: 14 BRPM | WEIGHT: 178 LBS

## 2021-08-05 PROCEDURE — 94729 DIFFUSING CAPACITY: CPT

## 2021-08-05 PROCEDURE — 99214 OFFICE O/P EST MOD 30 MIN: CPT | Mod: 25

## 2021-08-05 PROCEDURE — 94010 BREATHING CAPACITY TEST: CPT

## 2021-08-05 PROCEDURE — ZZZZZ: CPT

## 2021-08-05 PROCEDURE — 94727 GAS DIL/WSHOT DETER LNG VOL: CPT

## 2021-08-05 PROCEDURE — 88738 HGB QUANT TRANSCUTANEOUS: CPT

## 2021-08-07 NOTE — PROCEDURE
[FreeTextEntry1] : Pulmonary noted as before in my office today: Spirometry is within normal limits; lung volumes within normal limits; diffusion is within normal limits.

## 2021-08-07 NOTE — ASSESSMENT
[FreeTextEntry1] : Repeat chest CT scan for follow-up in 2 months.\par Medications reviewed. Continue present medications.\par

## 2021-08-08 LAB — POCT - HEMOGLOBIN (HGB), QUANTITATIVE, TRANSCUTANEOUS: 13.8

## 2021-10-05 ENCOUNTER — OUTPATIENT (OUTPATIENT)
Dept: OUTPATIENT SERVICES | Facility: HOSPITAL | Age: 61
LOS: 1 days | End: 2021-10-05
Payer: COMMERCIAL

## 2021-10-05 ENCOUNTER — APPOINTMENT (OUTPATIENT)
Dept: CT IMAGING | Facility: CLINIC | Age: 61
End: 2021-10-05
Payer: COMMERCIAL

## 2021-10-05 DIAGNOSIS — Z90.49 ACQUIRED ABSENCE OF OTHER SPECIFIED PARTS OF DIGESTIVE TRACT: Chronic | ICD-10-CM

## 2021-10-05 DIAGNOSIS — Z00.8 ENCOUNTER FOR OTHER GENERAL EXAMINATION: ICD-10-CM

## 2021-10-05 DIAGNOSIS — R91.8 OTHER NONSPECIFIC ABNORMAL FINDING OF LUNG FIELD: ICD-10-CM

## 2021-10-05 PROCEDURE — 71250 CT THORAX DX C-: CPT

## 2021-10-05 PROCEDURE — 71250 CT THORAX DX C-: CPT | Mod: 26

## 2021-11-05 ENCOUNTER — APPOINTMENT (OUTPATIENT)
Dept: PULMONOLOGY | Facility: CLINIC | Age: 61
End: 2021-11-05
Payer: COMMERCIAL

## 2021-11-05 VITALS
HEART RATE: 68 BPM | TEMPERATURE: 97.8 F | OXYGEN SATURATION: 98 % | SYSTOLIC BLOOD PRESSURE: 118 MMHG | DIASTOLIC BLOOD PRESSURE: 74 MMHG

## 2021-11-05 PROCEDURE — 99214 OFFICE O/P EST MOD 30 MIN: CPT

## 2021-11-07 NOTE — PROCEDURE
[FreeTextEntry1] : \par   CT Chest No Cont             Final\par \par No Documents Attached\par \par \par \par \par   EXAM:  CT CHEST\par \par \par PROCEDURE DATE:  10/05/2021\par \par \par \par INTERPRETATION:  CT CHEST WITHOUT CONTRAST\par \par INDICATION: Groundglass opacity present on imaging of the lung.\par \par TECHNIQUE: Unenhanced helical images were obtained of the chest. Coronal and sagittal images were reconstructed. Maximum intensity projection images were generated.\par \par COMPARISON: Radiograph chest 11/10/2020 and 7/16/2020.\par \par FINDINGS:\par \par Tubes/Lines: None.\par \par Lungs, airways and pleura: Since 10/22/2020, the bilateral predominantly lower lobe groundglass opacities have decreased. The bilateral juxtapleural reticular opacities are unchanged. The airways and pleura are normal.\par \par Mediastinum: Thyroid gland is normal. The esophagus is normal. There are no enlarged chest lymph nodes. The heart is normal in size. Aortic calcifications. Aorta is normal in caliber.\par \par Upper Abdomen: The hypodense right lobe hepatic lesions are unchanged and are statistically benign and representing cysts.\par \par Bones And Soft Tissues: The bones are unremarkable.  The soft tissues are unremarkable.\par \par \par IMPRESSION:\par \par 1.  Since 10/20/2018 2020, the bilateral groundglass opacities have decreased.\par \par --- End of Report ---\par \par \par \par \par \par \par GRACIELA GATICA MD; Attending Radiologist\par This document has been electronically signed. Oct  5 2021  4:04PM\par \par  \par \par  Ordered by: MAGALIS العلي       Collected/Examined: 05Oct2021 12:40PM       \par Verified by: MAGALIS العلي 08Oct2021 09:33AM       \par  Result Communication: No patient communication needed at this time;\par Stage: Final       \par  Performed at: [facility] [place]       Resulted: 05Oct2021 03:50PM       Last Updated: 08Oct2021 09:33AM       Accession: [id number]

## 2022-01-14 ENCOUNTER — APPOINTMENT (OUTPATIENT)
Dept: PULMONOLOGY | Facility: CLINIC | Age: 62
End: 2022-01-14
Payer: COMMERCIAL

## 2022-01-14 PROCEDURE — 94010 BREATHING CAPACITY TEST: CPT

## 2022-01-14 PROCEDURE — 94729 DIFFUSING CAPACITY: CPT

## 2022-01-14 PROCEDURE — 94727 GAS DIL/WSHOT DETER LNG VOL: CPT

## 2022-01-14 PROCEDURE — 88738 HGB QUANT TRANSCUTANEOUS: CPT

## 2022-04-12 ENCOUNTER — RESULT REVIEW (OUTPATIENT)
Age: 62
End: 2022-04-12

## 2022-06-29 ENCOUNTER — APPOINTMENT (OUTPATIENT)
Dept: PULMONOLOGY | Facility: CLINIC | Age: 62
End: 2022-06-29

## 2022-07-27 ENCOUNTER — TRANSCRIPTION ENCOUNTER (OUTPATIENT)
Age: 62
End: 2022-07-27

## 2022-09-02 ENCOUNTER — APPOINTMENT (OUTPATIENT)
Dept: PULMONOLOGY | Facility: CLINIC | Age: 62
End: 2022-09-02

## 2022-09-02 VITALS
BODY MASS INDEX: 33.13 KG/M2 | HEIGHT: 62 IN | RESPIRATION RATE: 16 BRPM | OXYGEN SATURATION: 99 % | HEART RATE: 64 BPM | WEIGHT: 180 LBS | DIASTOLIC BLOOD PRESSURE: 72 MMHG | SYSTOLIC BLOOD PRESSURE: 107 MMHG | TEMPERATURE: 98.1 F

## 2022-09-02 PROCEDURE — 94729 DIFFUSING CAPACITY: CPT

## 2022-09-02 PROCEDURE — ZZZZZ: CPT

## 2022-09-02 PROCEDURE — 94727 GAS DIL/WSHOT DETER LNG VOL: CPT

## 2022-09-02 PROCEDURE — 99214 OFFICE O/P EST MOD 30 MIN: CPT | Mod: 25

## 2022-09-02 PROCEDURE — 88738 HGB QUANT TRANSCUTANEOUS: CPT

## 2022-09-02 PROCEDURE — 94010 BREATHING CAPACITY TEST: CPT

## 2022-09-04 NOTE — ASSESSMENT
[FreeTextEntry1] : Continue Breo Ellipta.\par Check PFT for follow-up.\par Repeat chest CT scan for follow-up now.\par Return for pulmonary follow-up after CT scan has been performed.

## 2022-09-04 NOTE — PROCEDURE
[FreeTextEntry1] : Pulmonary Function Test performed in my office today: Spirometry: Within normal limits; Lung Volume: Within normal limits; Diffusion: Within normal limits.\par \par \par   CT Chest No Cont             Final\par \par No Documents Attached\par \par \par \par \par   EXAM:  CT CHEST\par \par \par PROCEDURE DATE:  10/05/2021\par \par \par \par INTERPRETATION:  CT CHEST WITHOUT CONTRAST\par \par INDICATION: Groundglass opacity present on imaging of the lung.\par \par TECHNIQUE: Unenhanced helical images were obtained of the chest. Coronal and sagittal images were reconstructed. Maximum intensity projection images were generated.\par \par COMPARISON: Radiograph chest 11/10/2020 and 7/16/2020.\par \par FINDINGS:\par \par Tubes/Lines: None.\par \par Lungs, airways and pleura: Since 10/22/2020, the bilateral predominantly lower lobe groundglass opacities have decreased. The bilateral juxtapleural reticular opacities are unchanged. The airways and pleura are normal.\par \par Mediastinum: Thyroid gland is normal. The esophagus is normal. There are no enlarged chest lymph nodes. The heart is normal in size. Aortic calcifications. Aorta is normal in caliber.\par \par Upper Abdomen: The hypodense right lobe hepatic lesions are unchanged and are statistically benign and representing cysts.\par \par Bones And Soft Tissues: The bones are unremarkable.  The soft tissues are unremarkable.\par \par \par IMPRESSION:\par \par 1.  Since 10/20/2018 2020, the bilateral groundglass opacities have decreased.\par \par --- End of Report ---\par \par \par \par \par \par \par GRACIELA GATICA MD; Attending Radiologist\par This document has been electronically signed. Oct  5 2021  4:04PM\par \par  \par \par  Ordered by: MAGALIS العلي       Collected/Examined: 05Oct2021 12:40PM       \par Verified by: MAGALIS العلي 08Oct2021 09:33AM       \par  Result Communication: No patient communication needed at this time;\par Stage: Final       \par  Performed at: [facility] [place]       Resulted: 05Oct2021 03:50PM       Last Updated: 08Oct2021 09:33AM       Accession: [id number]

## 2022-09-05 LAB — POCT - HEMOGLOBIN (HGB), QUANTITATIVE, TRANSCUTANEOUS: 13.7

## 2022-09-28 ENCOUNTER — APPOINTMENT (OUTPATIENT)
Dept: CT IMAGING | Facility: CLINIC | Age: 62
End: 2022-09-28

## 2022-09-28 ENCOUNTER — OUTPATIENT (OUTPATIENT)
Dept: OUTPATIENT SERVICES | Facility: HOSPITAL | Age: 62
LOS: 1 days | End: 2022-09-28
Payer: COMMERCIAL

## 2022-09-28 DIAGNOSIS — Z90.49 ACQUIRED ABSENCE OF OTHER SPECIFIED PARTS OF DIGESTIVE TRACT: Chronic | ICD-10-CM

## 2022-09-28 DIAGNOSIS — R91.8 OTHER NONSPECIFIC ABNORMAL FINDING OF LUNG FIELD: ICD-10-CM

## 2022-09-28 DIAGNOSIS — Z00.8 ENCOUNTER FOR OTHER GENERAL EXAMINATION: ICD-10-CM

## 2022-09-28 PROCEDURE — 71250 CT THORAX DX C-: CPT

## 2022-09-28 PROCEDURE — 71250 CT THORAX DX C-: CPT | Mod: 26

## 2022-10-28 ENCOUNTER — APPOINTMENT (OUTPATIENT)
Dept: PULMONOLOGY | Facility: CLINIC | Age: 62
End: 2022-10-28

## 2022-10-28 VITALS — SYSTOLIC BLOOD PRESSURE: 98 MMHG | DIASTOLIC BLOOD PRESSURE: 55 MMHG | OXYGEN SATURATION: 99 % | HEART RATE: 62 BPM

## 2022-10-28 PROCEDURE — 99213 OFFICE O/P EST LOW 20 MIN: CPT

## 2022-10-28 RX ORDER — FLUTICASONE FUROATE AND VILANTEROL TRIFENATATE 200; 25 UG/1; UG/1
200-25 POWDER RESPIRATORY (INHALATION) DAILY
Qty: 3 | Refills: 3 | Status: COMPLETED | COMMUNITY
Start: 2020-08-04 | End: 2022-10-28

## 2022-10-28 RX ORDER — BACLOFEN 10 MG/1
10 TABLET ORAL 3 TIMES DAILY
Qty: 50 | Refills: 0 | Status: COMPLETED | COMMUNITY
Start: 2017-09-11 | End: 2022-10-28

## 2022-10-28 RX ORDER — AZITHROMYCIN 250 MG/1
250 TABLET, FILM COATED ORAL
Qty: 1 | Refills: 0 | Status: COMPLETED | COMMUNITY
Start: 2021-01-05 | End: 2022-10-28

## 2022-10-28 RX ORDER — PREDNISONE 10 MG/1
10 TABLET ORAL
Qty: 18 | Refills: 0 | Status: COMPLETED | COMMUNITY
Start: 2021-01-05 | End: 2022-10-28

## 2022-10-29 NOTE — PROCEDURE
[FreeTextEntry1] :    CT Chest No Cont             Final  No Documents Attached       EXAM: [id number] - CT CHEST  - ORDERED BY: MAGALIS العلي   PROCEDURE DATE:  09/28/2022    INTERPRETATION:  INDICATION: Abnormal chest CT, groundglass opacity TECHNIQUE: Unenhanced CT of the chest. Coronal, sagittal, and MIP images were reconstructed and reviewed. COMPARISON: 10/5/2021 chest CT.  FINDINGS:  AIRWAYS, LUNGS and PLEURA: Patent central airways. Stable mild peripheral linear and groundglass opacities predominantly within the dependent portions of the lower lobes. The lungs are otherwise clear. No pleural effusion.  MEDIASTINUM AND PELON: No lymphadenopathy.  HEART AND VESSELS: Heart size is normal. No pericardial effusion. Thoracic aorta and pulmonary artery normal in diameter.  VISUALIZED UPPER ABDOMEN: Stable small cyst and too small to characterize hypodensities within the liver parenchyma.  CHEST WALL AND BONES: No aggressive osseous lesion.  LOWER NECK: Within normal limits.  IMPRESSION:  Stable mild peripheral linear and groundglass opacities.  --- End of Report ---       LOUIE BRYAN MD; Attending Radiologist This document has been electronically signed. Oct  3 2022 10:16AM      Ordered by: MAGALIS العلي       Collected/Examined: [id number] 03:34PM        Verified by: MAGALIS العلي 06Oct2022 05:00PM         Result Communication: No patient communication needed at this time; Stage: Final         Performed at: [facility] [place]       Resulted: [id number] 10:04AM       Last Updated: [id number] 05:00PM       Accession: [id number]

## 2022-10-29 NOTE — HISTORY OF PRESENT ILLNESS
[TextBox_4] : Overall feeling well; has not used any inhales because of being prescribed 2 different ones \par \par Would also like to review CT scan

## 2022-12-14 ENCOUNTER — APPOINTMENT (OUTPATIENT)
Dept: ORTHOPEDIC SURGERY | Facility: CLINIC | Age: 62
End: 2022-12-14

## 2022-12-14 VITALS — BODY MASS INDEX: 33.13 KG/M2 | WEIGHT: 180 LBS | HEIGHT: 62 IN

## 2022-12-14 DIAGNOSIS — M17.11 UNILATERAL PRIMARY OSTEOARTHRITIS, RIGHT KNEE: ICD-10-CM

## 2022-12-14 PROCEDURE — 73560 X-RAY EXAM OF KNEE 1 OR 2: CPT | Mod: LT

## 2022-12-14 PROCEDURE — 99214 OFFICE O/P EST MOD 30 MIN: CPT

## 2022-12-14 PROCEDURE — 73564 X-RAY EXAM KNEE 4 OR MORE: CPT | Mod: RT

## 2022-12-14 NOTE — PHYSICAL EXAM
[de-identified] :  Constitutional - the patient is of normal build and nutrition.  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  She does have a history of low back syndrome.  The patient remains oriented to person, time, and  place.  Mood is normal. Vital signs as recorded.  The patients gait is WNL.  He does get pain in the anterior aspect of her right knee especially with stairs.  The patient has satisfactory  balance and can stand on toes and heels.  The patient has no difficulty with respiration. Respiration at rest is a normal rate. The patient is not short of breath and has not become short of breath with short ambulation. There is no audible wheezing. No coughing.  Skin is normal for the patient's age. There are no abnormal masses or lymph nodes which stand out in the lower extremities.  Spine -as per office notes  UPPER EXTREMITIES   Shoulders ROM  is symmetric  and the motion is satisfactory.  There is no significant shoulder pain or limitation in motion which would make using a cane or a walker difficult. Shoulder stability and  strength are satisfactory.  There is normal motion in the wrists and elbows.  Circulation appears satisfactory with pedal pulses present.  There is no major edema in the lower legs. No skin tenderness or increased temperature. No major varicosities.  HIP EXAMINATION the abduction and abduction as well as rotation measurements were taken with the hip in flexion.  Motion There is symmetric motion with flexion 135 degrees, abduction 80 degrees, adduction 30 degrees, external rotation 80 degrees, internal rotation 20 degrees.  The hips have good range of motion. There is good strength across the hips. There is no crepitus in either hip. The alignment of the hips is normal.   KNEE EXAMINATION  Motion Right Knee has 0 to 125 degrees of motion with good medial  lateral and anterior posterior stability.  There is no major effusion.  There may be a small Baker's cyst.  She does have pain with subluxation and compression of her patella and she has some patellofemoral crepitus.  At this time she is not having any significant pain over the medial lateral joint line and a negative Steinmann test are present.   The patient has satisfactory strength across the knee.     Her symptoms are in line with the anterior knee pain syndrome and patellofemoral pain.       Left  Knee   has 0 to 135 degrees of motion with good medial lateral and anterior posterior stability.  There is no major effusion there is no Baker's cyst.  There is no significant patellofemoral crepitus.  The patient has satisfactory strength across the knee.              Ankle and foot examination Of the ankle has normal motion.  There is normal ankle stability.  The patient has no major abnormalities of the foot.  [de-identified] : \par 4 views were done of the right knee with an AP of the left knee on the standing views the medial and lateral compartments right left are open and appear to be within normal limits.  The Leggett view also shows maintenance of the medial lateral compartments the lateral and skyline views do show some degenerative changes of the patella where the patella is riding more on the lateral facet with some anterior peripheral medial osteophytes present off the medial femoral condyle.  Impression degenerative changes are early arthritis patellofemoral joint.\par

## 2022-12-14 NOTE — HISTORY OF PRESENT ILLNESS
[de-identified] : Ms. NICKOLAS CHILDS is a 62 year female who presents to office complaining of right knee pain. Pain is intermittent and is felt around the whole knee. \par Patient c/o localized right knee pain associated with prolonged walking and stairs. Pain sometimes wakes her up in the middle of the night. \par Pain is temporarily relieved with OTC Alleve and Motrin. Admits to buckling and giving away. \par Denies associated knee locking, instability,  or weakness\par All review of systems, family history, social history, surgical history, past medical history, medications, and allergies not previously stated as positive are negative. They were reviewed by me today with the patient and documented accordingly.

## 2022-12-14 NOTE — DISCUSSION/SUMMARY
[de-identified] :  This patient is having anterior knee pain in her pain appears to be more from the patellofemoral joint and radiates to the back of the knee.  At this time she does not want any injections for her knee but will try Celebrex 200 mg a day and may supplement with Tylenol.  She does not want to consider any injections with cortisone but in the future would be open to an injection with hyaluronic acid.  If her pain persists and is not improving then she will contact our office to get authorization for Euflexxa.  At this time she will stay on conservative measures.  Return visit in 3 months.

## 2023-01-20 ENCOUNTER — APPOINTMENT (OUTPATIENT)
Dept: PULMONOLOGY | Facility: CLINIC | Age: 63
End: 2023-01-20
Payer: COMMERCIAL

## 2023-01-20 VITALS — DIASTOLIC BLOOD PRESSURE: 62 MMHG | HEART RATE: 61 BPM | OXYGEN SATURATION: 98 % | SYSTOLIC BLOOD PRESSURE: 94 MMHG

## 2023-01-20 DIAGNOSIS — Z87.09 PERSONAL HISTORY OF OTHER DISEASES OF THE RESPIRATORY SYSTEM: ICD-10-CM

## 2023-01-20 DIAGNOSIS — Z87.01 PERSONAL HISTORY OF PNEUMONIA (RECURRENT): ICD-10-CM

## 2023-01-20 PROCEDURE — 94727 GAS DIL/WSHOT DETER LNG VOL: CPT

## 2023-01-20 PROCEDURE — 94010 BREATHING CAPACITY TEST: CPT

## 2023-01-20 PROCEDURE — 99214 OFFICE O/P EST MOD 30 MIN: CPT | Mod: 25

## 2023-01-20 PROCEDURE — 71046 X-RAY EXAM CHEST 2 VIEWS: CPT

## 2023-01-20 PROCEDURE — 88738 HGB QUANT TRANSCUTANEOUS: CPT

## 2023-01-20 PROCEDURE — 94729 DIFFUSING CAPACITY: CPT

## 2023-01-22 LAB — POCT - HEMOGLOBIN (HGB), QUANTITATIVE, TRANSCUTANEOUS: 15.5

## 2023-01-22 NOTE — PROCEDURE
[FreeTextEntry1] : Pulmonary Function Test obtained in office today which revealed: Spirometry: Within normal limits, Lung Volume: Within normal limits, Diffusion: Within normal limits.\par ___\par \par  POCT - Hemoglobin (Hgb), quantitative, transcutaneous             Final\par \par No Documents Attached\par \par \par   Test   Result   Flag Reference Goal Last Verified \par   POCT - Hemoglobin (Hgb), quantitative, transcutaneous 15.5      REQUIRED \par \par  Ordered by: MAGALIS العلي       Collected/Examined: 20Jan2023 09:35AM       \par Verification Required       Stage: Final       \par  Performed at: In Office       Performed by: LOBO RODRIGUEZ       Resulted: 20Jan2023 09:35AM       Last Updated: 20Jan2023 09:36AM       \par ___\par \par  Xray Chest 2 Views PA/Lat             Final\par \par No Documents Attached\par \par \par \par \par   \par Chest x-ray PA and lateral views performed in my office today showed clear lungs, no evidence of infiltrates or pleural effusions. \par \par \par  Ordered by: MAGALIS العلي       Collected/Examined: 20Jan2023 10:56AM       \par Verification Required       Stage: Final       \par  Performed at: In Office       Performed by: JACEK MOSS       Resulted: 20Jan2023 10:56AM       Last Updated: 20Jan2023 10:57AM

## 2023-01-22 NOTE — HISTORY OF PRESENT ILLNESS
[TextBox_4] : NICKOLAS CHILDS is a 62 year old female with history of asthma, who presents to the office for follow up evaluation of an abnormal chest CT scan. Patient reports that she is feeling well overall with no respiratory complaints. Patient states that she is currently using her inhaler. She states of having pneumonia and flu in November; was on a cruise and was treated in [place], where she also had a CT scan. Patient denies of having a repeat chest x-ray since November.

## 2023-01-22 NOTE — ASSESSMENT
[FreeTextEntry1] : Obtained and reviewed PFT, CXR results with patient today. \par Reviewed abnormal chest CT scan that the patient received on 11/2022 in the [place].\par Continue Symbicort for asthma/her ground glass.\par Repeat chest CT scan for follow-up in 2 months.\par Return for pulmonary follow up 2 months after receiving chest CT scan.

## 2023-01-22 NOTE — DISCUSSION/SUMMARY
[FreeTextEntry1] : Ms. NICKOLAS CHILDS is an 62 year old female, history of asthma. I have reviewed her most recent chest CT scan done on the [place] and compared it to prior chest CT scan in [facility] which showed similar imaging of prior lung scarring.

## 2023-01-22 NOTE — ADDENDUM
[FreeTextEntry1] : I, Radha Ronlakisha, acted solely as a scribe for Dr. Karoline Mauricio D.O., on this date 01/20/2023. \par \par All medical record entries made by the Scribe were at my, Dr. Karoline Mauricio D.O., direction and personally dictated by me on 01/20/2023. I have reviewed the chart and agree that the record accurately reflects my personal performance of the history, physical exam, assessment and plan. I have also personally directed, reviewed, and agreed with the chart.

## 2023-02-24 ENCOUNTER — APPOINTMENT (OUTPATIENT)
Dept: ORTHOPEDIC SURGERY | Facility: CLINIC | Age: 63
End: 2023-02-24
Payer: COMMERCIAL

## 2023-02-24 VITALS — SYSTOLIC BLOOD PRESSURE: 110 MMHG | HEART RATE: 65 BPM | DIASTOLIC BLOOD PRESSURE: 75 MMHG

## 2023-02-24 PROCEDURE — 96372 THER/PROPH/DIAG INJ SC/IM: CPT

## 2023-02-24 PROCEDURE — 72170 X-RAY EXAM OF PELVIS: CPT | Mod: 59

## 2023-02-24 PROCEDURE — 99214 OFFICE O/P EST MOD 30 MIN: CPT | Mod: 25

## 2023-02-24 PROCEDURE — 72110 X-RAY EXAM L-2 SPINE 4/>VWS: CPT

## 2023-02-24 RX ORDER — KETOROLAC TROMETHAMINE 30 MG/ML
30 INJECTION, SOLUTION INTRAMUSCULAR; INTRAVENOUS
Qty: 1 | Refills: 0 | Status: COMPLETED | OUTPATIENT
Start: 2023-02-24

## 2023-02-24 RX ADMIN — KETOROLAC TROMETHAMINE 0 MG/ML: 30 INJECTION, SOLUTION INTRAMUSCULAR; INTRAVENOUS at 00:00

## 2023-02-24 NOTE — HISTORY OF PRESENT ILLNESS
[5] : an average pain level of 5/10 [10] : a maximum pain level of 10/10 [Bending] : worsened by bending [Walking] : worsened by walking [Weight Bearing] : worsened by weight bearing [Worsening] : worsening [___ mths] : [unfilled] month(s) ago [de-identified] : Patient presents here today with low back pain extending to left leg with numbness. No groin pain as per patient. \par Left lower back pain, left buttock pain, burning left thigh, some pain into shin and foot. Feels some weakness of left leg, increasing pain in right leg due to altered gait.\par Pain started ~2 months ago with right knee pain., that’s improved, now has left leg pain\par Retired in 5/2022.\par Does housework now.\par tylenol, aleve prn. \par s/p left L4-5 laminectomy, left L5-S1 discectomy on 7/14/16 [de-identified] : stairs [de-identified] : Aleve

## 2023-02-24 NOTE — DISCUSSION/SUMMARY
[Medication Risks Reviewed] : Medication risks reviewed [de-identified] : Patient reports progressive increase in her pain specially down her left leg over the past 2 months.  She has a history of left L4-5 laminectomy and left L5-S1 microdiscectomy in 2016.  Given the increasing loss of disc height at L5-S1 and spondylolisthesis at L4-5 recommended MRI lumbar spine for further evaluation.  For pain today offered injection of Toradol and she wanted proceed.  Under sterile conditions 30 mg of Toradol was administered intermuscularly by the LPN without incident.  Also prescribed her a course of oral steroids that she can start tomorrow and gabapentin that she can start tonight.  I will see her back after the MRI to discuss further treatment options which may include lumbar epidural steroid injection and physical therapy if the oral medications are not helpful.\par \par The patient was educated regarding their condition, treatment options as well as prescribed course of treatment. \par Risks and benefits as well as alternatives to the proposed treatment were also provided to the patient \par They were given the opportunity to have all their questions answered to their satisfaction.\par \par Vital signs were reviewed with the patient and the patient was instructed to followup with their primary care provider for further management. There were no PAs or scribes used in the evaluation, exam or treatment plan discussion. The surgeon was the primary evaluating or treating physician as noted above.

## 2023-02-24 NOTE — PHYSICAL EXAM
[Antalgic] : antalgic [Limited] : is limited [] : Motor: [NL] : Motor strength of the right lower extremity was normal [___/5] : left ([unfilled]/5) [LE] : Sensory: Intact in bilateral lower extremities [2+] : left patella 2+ [1+] : left ankle jerk 1+ [DP] : dorsalis pedis 2+ and symmetric bilaterally [PT] : posterior tibial 2+ and symmetric bilaterally [de-identified] : 4 views lumbar spine obtained today were compared to x-rays obtained on April 12, 2021.  Minimal trunk shift to the left is noted likely related to partially visualized right-sided thoracic scoliosis.  This is essentially unchanged from prior x-rays.  On the lateral projection no loss of disc height L5-S1 is unchanged.  Grade 1 spondylolisthesis is now noted at L4-5 which was not seen previously.  It is worsened in flexion to approximately 4 mm and reduces completely in extension suggesting mild early instability.  Loss of disc height is also noted at the L4-5 level.\par \par AP pelvis demonstrates normal appearance hips bilaterally.  No acute fractures.  Mild sacroiliac degeneration right more than left.  No acute fractures.\par \par _____\par \par \par EXAM: MR SPINE LUMBAR\par \par PROCEDURE DATE: 04/27/2021\par \par INTERPRETATION: MR LUMBAR SPINE\par \par CLINICAL INFORMATION: Low back pain and stiffness for one month. Status post discectomy 2016.\par TECHNIQUE: Multiplanar, multisequence MR imaging was obtained of the lumbar spine without intravenous contrast. Motion artifact degrades some of the sequences.\par COMPARISON: MRI lumbar spine dated 7/3/2016.\par \par FINDINGS:\par \par SPINAL ALIGNMENT: Mild dextrocurvature. Trace retrolisthesis of L5 on S1.\par DISTAL CORD AND CONUS: Conus ends at the level of L1. 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 Scattered nerve root sleeve cysts are noted.\par SI JOINTS: Unremarkable.\par MARROW: Small acute Schmorl's nodes are seen at the superior endplate of L5 and the superior endplate of S1.\par PARASPINAL MUSCLE AND SOFT TISSUES: Postsurgical changes at L4/L5.\par INTRAABDOMINAL/INTRAPELVIC SOFT TISSUES: Small bilateral renal cysts are noted.\par \par DISC LEVEL EVALUATION: Degenerative loss of signal and height of the L4/L5 and L5/S1 disc.\par \par T11/T12: Only evaluated in the sagittal plane. There is moderate right facet arthrosis. There is a small left-sided nerve root sleeve cyst. There is moderate right neural foraminal narrowing. There is no central canal or left-sided neural foraminal narrowing. Similar to prior.\par T12/L1: Only evaluated in the sagittal plane. No spinal canal stenosis or neural foraminal narrowing.\par L1/L2: No spinal canal stenosis or neural foraminal narrowing. Lateral recesses are preserved.\par L2/L3: Mild bilateral facet arthrosis. No spinal canal stenosis or neural foraminal narrowing. Lateral recesses are preserved.\par L3/L4: Mild disc bulge flattening of the ventral thecal sac and minimally narrowing the lateral recesses. Moderate bilateral facet arthrosis and ligamentum flavum thickening. Combination results in mild spinal canal stenosis and mild bilateral foraminal narrowing. Similar to prior.\par L4/L5: Diffuse disc bulge flattening the ventral thecal sac and minimally narrowing the right greater than left lateral recesses with contact on the descending bilateral L5 nerve roots. Moderate bilateral facet arthrosis and ligamentum flavum thickening. Combination results in severe spinal canal stenosis and mild to moderate bilateral foraminal narrowing. These findings are grossly unchanged. L5 superior endplate Schmorl's node with surrounding edema is new compared to prior.\par L5/S1: There is a diffuse disc bulge which is asymmetric to the left. There is complete effacement of the left lateral recess which may be related to granulation tissue or a recurrent disc. This results in mass effect upon the descending left S1 nerve root and appears grossly similar compared to the prior examination. Moderate bilateral facet arthrosis. Mild spinal canal stenosis. There is unchanged mild right and mild to moderate left neural foraminal narrowing.\par \par IMPRESSION:\par \par Multilevel lumbar spondylosis.\par \par L5/S1: There is a diffuse disc bulge which is asymmetric to the left. There is complete effacement left lateral recess related to granulation tissue or recurrent disc. If indicated a contrast-enhanced MRI may be helpful in differentiating. This results in mass effect upon the descending left S1 nerve root appears grossly similar compared to the prior examination. There is unchanged mild right and mild to moderate left neural foraminal narrowing.\par \par L4/L5: There is severe spinal canal stenosis and mild to moderate bilateral foraminal narrowing. These findings are grossly unchanged.\par \par Small acute appearing Schmorl's nodes along the superior endplate of L5 and the superior endplate of S1 is new since the prior.\par \par Findings otherwise grossly unchanged as above.\par \par DAX KIM MD; Fellow Radiology\par This document has been electronically signed.\par JOURDAN JAIMES MD; Attending Radiologist\par This document has been electronically signed. Apr 29 2021 4:38PM [Poor Appearance] : well-appearing [Acute Distress] : not in acute distress [Obese] : not obese [Abl Mood] : in a normal mood [Abl Affect] : with normal affect [Poor Coordination] : normal coordination [Disorientation] : oriented x 3 [Painful] : not painful [SLR] : negative straight leg raise [Plantar Reflex Right Only] : absent on the right [Plantar Reflex Left Only] : absent on the left [DTR Reflexes Clonus Of Right Ankle (___ Beats)] : absent on the right [DTR Reflexes Clonus Of Left Ankle (___ Beats)] : absent on the left [FreeTextEntry2] : The pt is awake, alert and oriented to self, place and time, is uncomfortable and in no acute distress. Gait examination reveals a narrow based, non-ataxic, non-antalgic gait. Can heel and toe walk without difficulty. Inspection of neck, back and lower extremities bilaterally reveals no rashes or ecchymotic lesions.  There is no obvious abnormal spinal curvature in the sagittal and coronal planes. There is no tenderness over the cervical, thoracic  spine, or the upper and lower extremities musculature. Tenderness noted in the midline lumbar spine as well as Left paraspinal lumbar musculature. There is no sacroiliac tenderness. No greater trochanteric tenderness bilaterally. No atrophy or abnormal movements noted in the upper or lower extremities. There is no swelling noted in the upper or lower extremities bilaterally. No cervical lymphadenopathy noted anteriorly. No joint laxity noted in the upper and lower extremity joints bilaterally.\par There is no groin pain with hip internal rotation and a negative HILARY test bilaterally.  [de-identified] : d extension of 30 degrees [de-identified] : healed lumbar incision\par flex to mid  tibia, ext 30 degres no severe pain

## 2023-03-10 ENCOUNTER — APPOINTMENT (OUTPATIENT)
Dept: CT IMAGING | Facility: CLINIC | Age: 63
End: 2023-03-10
Payer: COMMERCIAL

## 2023-03-10 ENCOUNTER — APPOINTMENT (OUTPATIENT)
Dept: MRI IMAGING | Facility: CLINIC | Age: 63
End: 2023-03-10
Payer: COMMERCIAL

## 2023-03-10 ENCOUNTER — OUTPATIENT (OUTPATIENT)
Dept: OUTPATIENT SERVICES | Facility: HOSPITAL | Age: 63
LOS: 1 days | End: 2023-03-10
Payer: COMMERCIAL

## 2023-03-10 DIAGNOSIS — M48.061 SPINAL STENOSIS, LUMBAR REGION WITHOUT NEUROGENIC CLAUDICATION: ICD-10-CM

## 2023-03-10 DIAGNOSIS — R91.8 OTHER NONSPECIFIC ABNORMAL FINDING OF LUNG FIELD: ICD-10-CM

## 2023-03-10 DIAGNOSIS — Z90.49 ACQUIRED ABSENCE OF OTHER SPECIFIED PARTS OF DIGESTIVE TRACT: Chronic | ICD-10-CM

## 2023-03-10 PROCEDURE — 72148 MRI LUMBAR SPINE W/O DYE: CPT

## 2023-03-10 PROCEDURE — 71250 CT THORAX DX C-: CPT | Mod: 26

## 2023-03-10 PROCEDURE — 71250 CT THORAX DX C-: CPT

## 2023-03-10 PROCEDURE — 72148 MRI LUMBAR SPINE W/O DYE: CPT | Mod: 26

## 2023-03-22 ENCOUNTER — APPOINTMENT (OUTPATIENT)
Dept: ORTHOPEDIC SURGERY | Facility: CLINIC | Age: 63
End: 2023-03-22
Payer: COMMERCIAL

## 2023-03-22 VITALS — DIASTOLIC BLOOD PRESSURE: 81 MMHG | SYSTOLIC BLOOD PRESSURE: 118 MMHG | HEART RATE: 62 BPM

## 2023-03-22 DIAGNOSIS — M54.16 RADICULOPATHY, LUMBAR REGION: ICD-10-CM

## 2023-03-22 DIAGNOSIS — Z98.890 OTHER SPECIFIED POSTPROCEDURAL STATES: ICD-10-CM

## 2023-03-22 PROCEDURE — 99214 OFFICE O/P EST MOD 30 MIN: CPT

## 2023-03-28 ENCOUNTER — RESULT CHARGE (OUTPATIENT)
Age: 63
End: 2023-03-28

## 2023-03-29 ENCOUNTER — APPOINTMENT (OUTPATIENT)
Dept: PULMONOLOGY | Facility: CLINIC | Age: 63
End: 2023-03-29
Payer: COMMERCIAL

## 2023-03-29 VITALS
WEIGHT: 184 LBS | BODY MASS INDEX: 33.65 KG/M2 | OXYGEN SATURATION: 98 % | SYSTOLIC BLOOD PRESSURE: 100 MMHG | DIASTOLIC BLOOD PRESSURE: 67 MMHG | HEART RATE: 63 BPM

## 2023-03-29 PROCEDURE — ZZZZZ: CPT

## 2023-03-29 PROCEDURE — 99214 OFFICE O/P EST MOD 30 MIN: CPT | Mod: 25

## 2023-03-29 PROCEDURE — 94010 BREATHING CAPACITY TEST: CPT

## 2023-03-29 PROCEDURE — 94727 GAS DIL/WSHOT DETER LNG VOL: CPT

## 2023-03-29 PROCEDURE — 94729 DIFFUSING CAPACITY: CPT

## 2023-03-29 RX ORDER — FLUTICASONE FUROATE AND VILANTEROL TRIFENATATE 200; 25 UG/1; UG/1
200-25 POWDER RESPIRATORY (INHALATION)
Qty: 3 | Refills: 3 | Status: COMPLETED | COMMUNITY
Start: 2020-07-02 | End: 2023-03-29

## 2023-03-29 RX ORDER — METHYLPREDNISOLONE 4 MG/1
4 TABLET ORAL
Qty: 1 | Refills: 0 | Status: COMPLETED | COMMUNITY
Start: 2023-02-24 | End: 2023-03-29

## 2023-03-29 NOTE — PROCEDURE
[FreeTextEntry1] : Pulmonary Function Test obtained in office today which revealed: Spirometry: Within normal limits, Lung Volume: Within normal limits, Diffusion: Within normal limits. \par ___\par   CT Chest No Cont             Final\par \par No Documents Attached\par \par \par \par \par   EXAM: [id number] - CT CHEST  - ORDERED BY: MAGALIS العلي\par \par \par PROCEDURE DATE:  03/10/2023\par \par \par \par INTERPRETATION:  Clinical indication: Groundglass opacity.\par \par Axial CT images of the chest are obtained without intravenous administration of contrast.\par \par Comparison is made with the chest CT of September 28, 2022.\par \par No enlarged axillary, mediastinal or hilar lymph nodes.\par \par No pleural or pericardial effusion. Heart size is normal. Mild aortic root calcifications.\par \par Evaluation of the upper abdomen demonstrate hepatic cyst subcentimeter hepatic low-density to characterize without significant interval change.\par \par Evaluation of the lungs demonstrate bilateral ill-defined nodular, linear and patchy predominantly groundglass opacities with the densest components within the dependent portions of the right lower lobe are without significant interval change since September 28, 2022.\par \par No bronchiectasis or honeycombing. No central endobronchial lesions.\par \par Degenerative changes of the spine. Hemangioma within the T6 vertebral body is unchanged since July 16, 2020.\par \par IMPRESSION:\par \par Bilateral ill-defined groundglass opacities are unchanged since September 28, 2022.\par \par --- End of Report ---\par \par \par \par \par \par \par DOMO ALARCON MD; Attending Radiologist\par This document has been electronically signed. Mar 14 2023  5:31PM\par \par  \par \par  Ordered by: MAGALIS العلي       Collected/Examined: 10Mar2023 07:52AM       \par Verified by: MAGALIS العلي 19Mar2023 01:57PM       \par  Result Communication: No patient communication needed at this time;\par Stage: Final       \par  Performed at: [facility]       Resulted: 14Mar2023 05:16PM       Last Updated: 19Mar2023 01:57PM       Accession: [id number]

## 2023-03-29 NOTE — ASSESSMENT
[FreeTextEntry1] : Reviewed chest CT scan done on 03/10/2023.\par Obtained and reviewed Spirometry results with patient today. \par Reduce Symbicort HFA to 1 puff twice a day for asthma/her stable groundglass opacities.\par Return for pulmonary follow up in in 6 weeks to see if Symbicort could be weaned off.
room air

## 2023-03-29 NOTE — DISCUSSION/SUMMARY
[FreeTextEntry1] : Ms. NICKOLAS CHILDS is an 62 year old female, history of asthma. Patient with groundglass opacities that are currently stable indicated in recent chest CT scan likely secondary to lung scarring from recent COVID-19 infection. Secondly, patient is improving from an asthma perspective and I have reduced her Symbicort intake by 50%.

## 2023-03-29 NOTE — HISTORY OF PRESENT ILLNESS
[TextBox_4] : NICKOLAS CHILDS is a 62 year old female with history of asthma, who presents to the office for follow up evaluation of an abnormal chest CT scan. Patient reports that she is feeling well overall with no respiratory complaints. She denies of having any dyspnea. Patient states that she is currently using her Symbicort for her asthma.\par \par Had CT done 03/10/23

## 2023-06-09 ENCOUNTER — APPOINTMENT (OUTPATIENT)
Dept: PULMONOLOGY | Facility: CLINIC | Age: 63
End: 2023-06-09

## 2023-08-03 NOTE — REASON FOR VISIT
"Stroke Progress Note       Chief Complaint: Left facial weakness/slurred speech, left-sided weakness    Subjective    Subjective     Subjective:  No acute events overnight.  Patient reports feeling significantly better today.  She is awake and alert, oriented to person place and situation, after moment she was eventually able to tell me the month.  She tells us that her memory is \"shot\".    Review of Systems   Constitutional:  Negative for chills and fever.   Respiratory: Negative.     Musculoskeletal:  Positive for arthralgias.   Skin: Negative.    Neurological:  Positive for weakness. Negative for facial asymmetry and numbness.   Psychiatric/Behavioral:  Positive for confusion.           Objective    Objective      Temp:  [97.4 øF (36.3 øC)-98.5 øF (36.9 øC)] 98.2 øF (36.8 øC)  Heart Rate:  [65-68] 65  Resp:  [18] 18  BP: (107-177)/(54-89) 161/89        Neurological Exam  Mental Status  Awake and alert. Oriented to person, place and time. Speech is normal. Language is fluent with no aphasia. Attention and concentration are normal.    Cranial Nerves  CN III, IV, VI: Extraocular movements intact bilaterally. Normal lids and orbits bilaterally.  CN VII: Full and symmetric facial movement.  CN IX, X: Palate elevates symmetrically  CN XI: Shoulder shrug strength is normal.  CN XII: Tongue midline without atrophy or fasciculations.    Motor  Normal muscle bulk throughout. Normal muscle tone. No abnormal involuntary movements.  Mild generalized weakness, no focal motor deficit.    Coordination  Right: Finger-to-nose normal.Left: Finger-to-nose normal.    Gait    Patient was able to assist stand from bed unassisted.    Physical Exam  Vitals and nursing note reviewed.   Constitutional:       General: She is awake. She is not in acute distress.     Appearance: Normal appearance.   HENT:      Head: Normocephalic.      Mouth/Throat:      Mouth: Mucous membranes are moist.      Pharynx: Oropharynx is clear.   Eyes:      " General: Lids are normal.      Extraocular Movements: Extraocular movements intact.   Cardiovascular:      Rate and Rhythm: Normal rate and regular rhythm.   Pulmonary:      Effort: Pulmonary effort is normal. No respiratory distress.   Skin:     General: Skin is warm and dry.   Neurological:      Mental Status: She is alert.   Psychiatric:         Mood and Affect: Mood normal.         Speech: Speech normal.         Behavior: Behavior normal.       Hospital Meds:  Scheduled- aspirin, 81 mg, Oral, Daily  Bempedoic Acid-Ezetimibe, 1 tablet, Oral, Nightly  Calcium Carb-Cholecalciferol, 1 tablet, Oral, BID  clopidogrel, 75 mg, Oral, Daily  donepezil, 5 mg, Oral, Nightly  ferrous sulfate, 325 mg, Oral, Daily With Breakfast  fluticasone, 2 spray, Nasal, Nightly  gabapentin, 100 mg, Oral, BID  heparin (porcine), 5,000 Units, Subcutaneous, Q12H  hydrALAZINE, 50 mg, Oral, BID  isosorbide mononitrate, 30 mg, Oral, Daily  LORazepam, 0.5 mg, Oral, BID  modafinil, 200 mg, Oral, Daily  multivitamin with minerals, 1 tablet, Oral, Daily  pantoprazole, 40 mg, Oral, Nightly  senna-docusate sodium, 2 tablet, Oral, BID  sertraline, 200 mg, Oral, Nightly  sodium chloride, 10 mL, Intravenous, Q12H  vitamin B-12, 1,000 mcg, Oral, Nightly      Infusions-     PRNs-   acetaminophen    senna-docusate sodium **AND** polyethylene glycol **AND** bisacodyl **AND** bisacodyl    Diclofenac Sodium    hydrALAZINE    nitroglycerin    ondansetron    prochlorperazine    sodium chloride    sodium chloride    sodium chloride    sodium chloride    Results Review:    I reviewed the patient's new clinical results.    Imaging Results (Last 24 Hours)       Procedure Component Value Units Date/Time    CT Head Without Contrast [[id number]] Collected: 08/03/23 0950     Updated: 08/03/23 0953    Narrative:      CT HEAD WO CONTRAST-     CLINICAL INDICATION: Stroke, follow up; G45.9-Transient cerebral  ischemic attack, unspecified; R47.81-Slurred speech;  R29.810-Facial  weakness; N18.4-Chronic kidney disease, stage 4 (severe); I27.0-Primary  pulmonary hypertension        COMPARISON: 08/02/2023      TECHNIQUE: Axial images of the brain were obtained with out intravenous  contrast.  Reformatted images were created in the sagittal and coronal  planes.     DOSE:     Radiation dose reduction techniques were utilized per ALARA protocol.  Automated exposure control was initiated through either or Kanshu or  DoseRight software packages by  protocol.        FINDINGS:    BRAIN:  Unremarkable.  No hemorrhage.  No significant white matter  disease.  No edema.       VENTRICLES:  Unremarkable.  No ventriculomegaly.       BONES/JOINTS:  Unremarkable.  No acute fracture.       SOFT TISSUES:  Unremarkable.       SINUSES:  no air fluid levels       MASTOID AIR CELLS:  Unremarkable as visualized.  No mastoid effusion.          Impression:          1. No evidence of an acute ischemic event  2. No parenchymal mass, hemorrhage, or midline shift     This report was finalized on 8/3/2023 9:51 AM by Dr. Nghia Sorenson MD.             Results for orders placed during the hospital encounter of 08/01/23    Adult Transthoracic Echo Complete w/ Color, Spectral and Contrast if necessary per protocol    Interpretation Summary    Left ventricular systolic function is normal. Calculated left ventricular EF = 68% Left ventricular ejection fraction appears to be 66 - 70%.    Left ventricular diastolic function was normal.    Estimated right ventricular systolic pressure from tricuspid regurgitation is normal (<35 mmHg).    CT Head Without Contrast    Result Date: 8/3/2023    1. No evidence of an acute ischemic event 2. No parenchymal mass, hemorrhage, or midline shift  This report was finalized on 8/3/2023 9:51 AM by Dr. Nghia Sorenson MD.      CT Angiogram Neck    Result Date: 8/2/2023   1.  Eccentric calcific plaques within both carotid bulbs causing moderate stenosis (50-55% by NASCET  criteria).  This report was finalized on 8/2/2023 1:52 AM by Fuentes Galo MD.      XR Chest 1 View    Result Date: 8/2/2023  No acute cardiopulmonary process.  This report was finalized on 8/2/2023 12:52 AM by Alex Pallas, DO.      CT Head Without Contrast Stroke Protocol    Result Date: 8/2/2023  No acute intracranial process.  Report called to Dr. Bustamante at 120AM EST 8/2/2023.  This report was finalized on 8/2/2023 1:22 AM by Alex Pallas, DO.      CT Angiogram Head w AI Analysis of LVO    Result Date: 8/2/2023   1.  Eccentric calcific plaques within intradural portion of both internal carotid arteries causing multifocal areas of moderate to high-grade stenosis. 2.  Eccentric calcific plaque within intradural portion of right vertebral artery causing mild stenosis.  This report was finalized on 8/2/2023 1:56 AM by Fuentes Galo MD.      CT CEREBRAL PERFUSION WITH & WITHOUT CONTRAST    Result Date: 8/2/2023   1. No evidence of acute infarction/ischemia on present perfusion study.  Please note that small infarcts are difficult to appreciate on perfusion imaging.  This report was finalized on 8/2/2023 1:45 AM by Fuentes Galo MD.        No results found for: HGBA1C   Lab Results   Component Value Date    CHOL 143 08/02/2023    TRIG 241 (H) 08/02/2023    HDL 31 (L) 08/02/2023    LDL 72 08/02/2023      Lab Results   Component Value Date    WBC 6.36 08/02/2023    HGB 10.0 (L) 08/02/2023    HCT 31.4 (L) 08/02/2023    MCV 89.0 08/02/2023    PLT 85 (L) 08/02/2023      Lab Results   Component Value Date    GLUCOSE 82 08/02/2023    BUN 30 (H) 08/02/2023    CREATININE 1.78 (H) 08/02/2023    BCR 16.9 08/02/2023    K 3.8 08/02/2023    CO2 24.8 08/02/2023    CALCIUM 9.1 08/02/2023    ALBUMIN 4.3 08/02/2023    AST 22 08/02/2023    ALT 10 08/02/2023      No results found for: TSH  No results found for: TEDDENYR70  No results found for: FOLATE          Assessment/Plan     Assessment/Plan:  This is a 76-year-old female who  initially presented with chest pain, subsequently was noted to have left facial weakness and slurred speech and left side weakness.  Patient was out of the window for medical thrombolysis when the code stroke was called .  Symptoms slowly improved.  She received a loading dose of Plavix in the ED.     CT head shows no acute abnormality.  CTA head and neck showed  mild - moderate intra and extracranial atherosclerosis.  No target vessel for endovascular therapy.       Likely TIA.,  Possibly minor ischemic stroke not seen on head CT.  Unable to have MRI due to presence of PPM.  Repeat CT head after 24 hours was negative for any acute changes.      -DAPT with Plavix 75 mg daily and aspirin 81 mg daily x21 days, to be followed by aspirin monotherapy  -Patient has allergic reaction to statins, continue home Nexlizet  -Normal blood pressure goals  -TTE with EF 66 to 70%  -We will consider outpatient MRI in [place] if pacemaker is found to be compatible, PPM info not available at present  -Follow-up outpatient Stroke-like in 4 to 6 weeks  -Okay to discharge home from neurology standpoint  -Stroke education to patient and family      The case was discussed with the patient, and Dr. Behbahani.  Tele-Neurology will sign off for now, please feel free to call with any questions/concerns.  Thank you again for the consult.    Gordo Vela, TRACE  08/03/23  11:37 EDT    This was an audio and video enabled telemedicine encounter.  Dr. Florentino was present for encounter via telemedicine.  Verbal consent taken.     [Follow-Up Visit] : a follow-up visit for [FreeTextEntry2] : Stiffness to lower back.

## 2023-09-28 ENCOUNTER — APPOINTMENT (OUTPATIENT)
Dept: PULMONOLOGY | Facility: CLINIC | Age: 63
End: 2023-09-28
Payer: COMMERCIAL

## 2023-09-28 VITALS — OXYGEN SATURATION: 97 % | HEART RATE: 82 BPM | SYSTOLIC BLOOD PRESSURE: 108 MMHG | DIASTOLIC BLOOD PRESSURE: 74 MMHG

## 2023-09-28 DIAGNOSIS — R05.9 COUGH, UNSPECIFIED: ICD-10-CM

## 2023-09-28 DIAGNOSIS — J45.909 UNSPECIFIED ASTHMA, UNCOMPLICATED: ICD-10-CM

## 2023-09-28 DIAGNOSIS — R91.8 OTHER NONSPECIFIC ABNORMAL FINDING OF LUNG FIELD: ICD-10-CM

## 2023-09-28 PROCEDURE — 94729 DIFFUSING CAPACITY: CPT

## 2023-09-28 PROCEDURE — ZZZZZ: CPT

## 2023-09-28 PROCEDURE — 94727 GAS DIL/WSHOT DETER LNG VOL: CPT

## 2023-09-28 PROCEDURE — 88738 HGB QUANT TRANSCUTANEOUS: CPT

## 2023-09-28 PROCEDURE — 99214 OFFICE O/P EST MOD 30 MIN: CPT | Mod: 25

## 2023-09-28 PROCEDURE — 94010 BREATHING CAPACITY TEST: CPT

## 2023-09-28 RX ORDER — GABAPENTIN 300 MG/1
300 CAPSULE ORAL
Qty: 30 | Refills: 0 | Status: COMPLETED | COMMUNITY
Start: 2023-02-24 | End: 2023-09-28

## 2023-09-28 RX ORDER — BUDESONIDE AND FORMOTEROL FUMARATE DIHYDRATE 160; 4.5 UG/1; UG/1
160-4.5 AEROSOL RESPIRATORY (INHALATION)
Qty: 3 | Refills: 3 | Status: COMPLETED | COMMUNITY
Start: 2022-09-08 | End: 2023-09-28

## 2023-09-28 RX ORDER — IBUPROFEN 600 MG/1
600 TABLET, FILM COATED ORAL 3 TIMES DAILY
Qty: 30 | Refills: 0 | Status: COMPLETED | COMMUNITY
Start: 2023-03-22 | End: 2023-09-28

## 2023-09-28 RX ORDER — CHROMIUM 200 MCG
TABLET ORAL
Refills: 0 | Status: ACTIVE | COMMUNITY

## 2023-09-28 RX ORDER — GABAPENTIN 100 MG/1
100 CAPSULE ORAL
Qty: 30 | Refills: 2 | Status: COMPLETED | COMMUNITY
Start: 2023-03-22 | End: 2023-09-28

## 2023-09-28 RX ORDER — TIZANIDINE 2 MG/1
2 TABLET ORAL EVERY 8 HOURS
Qty: 30 | Refills: 2 | Status: COMPLETED | COMMUNITY
Start: 2021-04-12 | End: 2023-09-28

## 2023-09-28 RX ORDER — PNV NO.95/FERROUS FUM/FOLIC AC 28MG-0.8MG
TABLET ORAL
Refills: 0 | Status: ACTIVE | COMMUNITY

## 2023-09-28 RX ORDER — CELECOXIB 200 MG/1
200 CAPSULE ORAL DAILY
Qty: 60 | Refills: 2 | Status: COMPLETED | COMMUNITY
Start: 2022-12-14 | End: 2023-09-28

## 2023-10-07 LAB — POCT - HEMOGLOBIN (HGB), QUANTITATIVE, TRANSCUTANEOUS: 15.4

## 2023-10-28 ENCOUNTER — EMERGENCY (EMERGENCY)
Facility: HOSPITAL | Age: 63
LOS: 1 days | Discharge: HOME | End: 2023-10-28
Attending: EMERGENCY MEDICINE
Payer: COMMERCIAL

## 2023-10-28 VITALS
HEART RATE: 85 BPM | TEMPERATURE: 98 F | RESPIRATION RATE: 18 BRPM | SYSTOLIC BLOOD PRESSURE: 131 MMHG | DIASTOLIC BLOOD PRESSURE: 81 MMHG | OXYGEN SATURATION: 98 %

## 2023-10-28 VITALS
WEIGHT: 188.94 LBS | OXYGEN SATURATION: 98 % | HEART RATE: 92 BPM | RESPIRATION RATE: 18 BRPM | SYSTOLIC BLOOD PRESSURE: 138 MMHG | HEIGHT: 62 IN | TEMPERATURE: 99 F | DIASTOLIC BLOOD PRESSURE: 82 MMHG

## 2023-10-28 DIAGNOSIS — Z90.49 ACQUIRED ABSENCE OF OTHER SPECIFIED PARTS OF DIGESTIVE TRACT: Chronic | ICD-10-CM

## 2023-10-28 LAB
ALBUMIN SERPL ELPH-MCNC: 3.8 G/DL — SIGNIFICANT CHANGE UP (ref 3.3–5)
ALBUMIN SERPL ELPH-MCNC: 3.8 G/DL — SIGNIFICANT CHANGE UP (ref 3.3–5)
ALBUMIN SERPL ELPH-MCNC: 4.2 G/DL — SIGNIFICANT CHANGE UP (ref 3.3–5)
ALBUMIN SERPL ELPH-MCNC: 4.2 G/DL — SIGNIFICANT CHANGE UP (ref 3.3–5)
ALP SERPL-CCNC: 66 U/L — SIGNIFICANT CHANGE UP (ref 40–120)
ALP SERPL-CCNC: 66 U/L — SIGNIFICANT CHANGE UP (ref 40–120)
ALP SERPL-CCNC: 68 U/L — SIGNIFICANT CHANGE UP (ref 40–120)
ALP SERPL-CCNC: 68 U/L — SIGNIFICANT CHANGE UP (ref 40–120)
ALT FLD-CCNC: 16 U/L — SIGNIFICANT CHANGE UP (ref 10–45)
ALT FLD-CCNC: 16 U/L — SIGNIFICANT CHANGE UP (ref 10–45)
ALT FLD-CCNC: 23 U/L — SIGNIFICANT CHANGE UP (ref 10–45)
ALT FLD-CCNC: 23 U/L — SIGNIFICANT CHANGE UP (ref 10–45)
ANION GAP SERPL CALC-SCNC: 10 MMOL/L — SIGNIFICANT CHANGE UP (ref 5–17)
ANION GAP SERPL CALC-SCNC: 10 MMOL/L — SIGNIFICANT CHANGE UP (ref 5–17)
ANION GAP SERPL CALC-SCNC: 9 MMOL/L — SIGNIFICANT CHANGE UP (ref 5–17)
ANION GAP SERPL CALC-SCNC: 9 MMOL/L — SIGNIFICANT CHANGE UP (ref 5–17)
APPEARANCE UR: CLEAR — SIGNIFICANT CHANGE UP
APPEARANCE UR: CLEAR — SIGNIFICANT CHANGE UP
AST SERPL-CCNC: 21 U/L — SIGNIFICANT CHANGE UP (ref 10–40)
AST SERPL-CCNC: 21 U/L — SIGNIFICANT CHANGE UP (ref 10–40)
AST SERPL-CCNC: 67 U/L — HIGH (ref 10–40)
AST SERPL-CCNC: 67 U/L — HIGH (ref 10–40)
BACTERIA # UR AUTO: NEGATIVE — SIGNIFICANT CHANGE UP
BACTERIA # UR AUTO: NEGATIVE — SIGNIFICANT CHANGE UP
BASOPHILS # BLD AUTO: 0.07 K/UL — SIGNIFICANT CHANGE UP (ref 0–0.2)
BASOPHILS # BLD AUTO: 0.07 K/UL — SIGNIFICANT CHANGE UP (ref 0–0.2)
BASOPHILS NFR BLD AUTO: 0.9 % — SIGNIFICANT CHANGE UP (ref 0–2)
BASOPHILS NFR BLD AUTO: 0.9 % — SIGNIFICANT CHANGE UP (ref 0–2)
BILIRUB SERPL-MCNC: 0.4 MG/DL — SIGNIFICANT CHANGE UP (ref 0.2–1.2)
BILIRUB UR-MCNC: NEGATIVE — SIGNIFICANT CHANGE UP
BILIRUB UR-MCNC: NEGATIVE — SIGNIFICANT CHANGE UP
BUN SERPL-MCNC: 12 MG/DL — SIGNIFICANT CHANGE UP (ref 7–23)
BUN SERPL-MCNC: 12 MG/DL — SIGNIFICANT CHANGE UP (ref 7–23)
BUN SERPL-MCNC: 13 MG/DL — SIGNIFICANT CHANGE UP (ref 7–23)
BUN SERPL-MCNC: 13 MG/DL — SIGNIFICANT CHANGE UP (ref 7–23)
CALCIUM SERPL-MCNC: 9.1 MG/DL — SIGNIFICANT CHANGE UP (ref 8.4–10.5)
CALCIUM SERPL-MCNC: 9.1 MG/DL — SIGNIFICANT CHANGE UP (ref 8.4–10.5)
CALCIUM SERPL-MCNC: 9.7 MG/DL — SIGNIFICANT CHANGE UP (ref 8.4–10.5)
CALCIUM SERPL-MCNC: 9.7 MG/DL — SIGNIFICANT CHANGE UP (ref 8.4–10.5)
CHLORIDE SERPL-SCNC: 104 MMOL/L — SIGNIFICANT CHANGE UP (ref 96–108)
CHLORIDE SERPL-SCNC: 104 MMOL/L — SIGNIFICANT CHANGE UP (ref 96–108)
CHLORIDE SERPL-SCNC: 105 MMOL/L — SIGNIFICANT CHANGE UP (ref 96–108)
CHLORIDE SERPL-SCNC: 105 MMOL/L — SIGNIFICANT CHANGE UP (ref 96–108)
CO2 SERPL-SCNC: 24 MMOL/L — SIGNIFICANT CHANGE UP (ref 22–31)
COLOR SPEC: SIGNIFICANT CHANGE UP
COLOR SPEC: SIGNIFICANT CHANGE UP
CREAT SERPL-MCNC: 0.88 MG/DL — SIGNIFICANT CHANGE UP (ref 0.5–1.3)
CREAT SERPL-MCNC: 0.88 MG/DL — SIGNIFICANT CHANGE UP (ref 0.5–1.3)
CREAT SERPL-MCNC: 0.97 MG/DL — SIGNIFICANT CHANGE UP (ref 0.5–1.3)
CREAT SERPL-MCNC: 0.97 MG/DL — SIGNIFICANT CHANGE UP (ref 0.5–1.3)
DIFF PNL FLD: ABNORMAL
DIFF PNL FLD: ABNORMAL
EGFR: 65 ML/MIN/1.73M2 — SIGNIFICANT CHANGE UP
EGFR: 65 ML/MIN/1.73M2 — SIGNIFICANT CHANGE UP
EGFR: 73 ML/MIN/1.73M2 — SIGNIFICANT CHANGE UP
EGFR: 73 ML/MIN/1.73M2 — SIGNIFICANT CHANGE UP
EOSINOPHIL # BLD AUTO: 0.19 K/UL — SIGNIFICANT CHANGE UP (ref 0–0.5)
EOSINOPHIL # BLD AUTO: 0.19 K/UL — SIGNIFICANT CHANGE UP (ref 0–0.5)
EOSINOPHIL NFR BLD AUTO: 2.5 % — SIGNIFICANT CHANGE UP (ref 0–6)
EOSINOPHIL NFR BLD AUTO: 2.5 % — SIGNIFICANT CHANGE UP (ref 0–6)
EPI CELLS # UR: 0 /HPF — SIGNIFICANT CHANGE UP
EPI CELLS # UR: 0 /HPF — SIGNIFICANT CHANGE UP
GLUCOSE SERPL-MCNC: 85 MG/DL — SIGNIFICANT CHANGE UP (ref 70–99)
GLUCOSE SERPL-MCNC: 85 MG/DL — SIGNIFICANT CHANGE UP (ref 70–99)
GLUCOSE SERPL-MCNC: 91 MG/DL — SIGNIFICANT CHANGE UP (ref 70–99)
GLUCOSE SERPL-MCNC: 91 MG/DL — SIGNIFICANT CHANGE UP (ref 70–99)
GLUCOSE UR QL: NEGATIVE — SIGNIFICANT CHANGE UP
GLUCOSE UR QL: NEGATIVE — SIGNIFICANT CHANGE UP
HCT VFR BLD CALC: 43.1 % — SIGNIFICANT CHANGE UP (ref 34.5–45)
HCT VFR BLD CALC: 43.1 % — SIGNIFICANT CHANGE UP (ref 34.5–45)
HGB BLD-MCNC: 13.9 G/DL — SIGNIFICANT CHANGE UP (ref 11.5–15.5)
HGB BLD-MCNC: 13.9 G/DL — SIGNIFICANT CHANGE UP (ref 11.5–15.5)
HYALINE CASTS # UR AUTO: 0 /LPF — SIGNIFICANT CHANGE UP (ref 0–2)
HYALINE CASTS # UR AUTO: 0 /LPF — SIGNIFICANT CHANGE UP (ref 0–2)
IMM GRANULOCYTES NFR BLD AUTO: 0.1 % — SIGNIFICANT CHANGE UP (ref 0–0.9)
IMM GRANULOCYTES NFR BLD AUTO: 0.1 % — SIGNIFICANT CHANGE UP (ref 0–0.9)
KETONES UR-MCNC: NEGATIVE — SIGNIFICANT CHANGE UP
KETONES UR-MCNC: NEGATIVE — SIGNIFICANT CHANGE UP
LEUKOCYTE ESTERASE UR-ACNC: NEGATIVE — SIGNIFICANT CHANGE UP
LEUKOCYTE ESTERASE UR-ACNC: NEGATIVE — SIGNIFICANT CHANGE UP
LYMPHOCYTES # BLD AUTO: 1.59 K/UL — SIGNIFICANT CHANGE UP (ref 1–3.3)
LYMPHOCYTES # BLD AUTO: 1.59 K/UL — SIGNIFICANT CHANGE UP (ref 1–3.3)
LYMPHOCYTES # BLD AUTO: 20.5 % — SIGNIFICANT CHANGE UP (ref 13–44)
LYMPHOCYTES # BLD AUTO: 20.5 % — SIGNIFICANT CHANGE UP (ref 13–44)
MCHC RBC-ENTMCNC: 25.8 PG — LOW (ref 27–34)
MCHC RBC-ENTMCNC: 25.8 PG — LOW (ref 27–34)
MCHC RBC-ENTMCNC: 32.3 GM/DL — SIGNIFICANT CHANGE UP (ref 32–36)
MCHC RBC-ENTMCNC: 32.3 GM/DL — SIGNIFICANT CHANGE UP (ref 32–36)
MCV RBC AUTO: 80.1 FL — SIGNIFICANT CHANGE UP (ref 80–100)
MCV RBC AUTO: 80.1 FL — SIGNIFICANT CHANGE UP (ref 80–100)
MONOCYTES # BLD AUTO: 0.8 K/UL — SIGNIFICANT CHANGE UP (ref 0–0.9)
MONOCYTES # BLD AUTO: 0.8 K/UL — SIGNIFICANT CHANGE UP (ref 0–0.9)
MONOCYTES NFR BLD AUTO: 10.3 % — SIGNIFICANT CHANGE UP (ref 2–14)
MONOCYTES NFR BLD AUTO: 10.3 % — SIGNIFICANT CHANGE UP (ref 2–14)
NEUTROPHILS # BLD AUTO: 5.09 K/UL — SIGNIFICANT CHANGE UP (ref 1.8–7.4)
NEUTROPHILS # BLD AUTO: 5.09 K/UL — SIGNIFICANT CHANGE UP (ref 1.8–7.4)
NEUTROPHILS NFR BLD AUTO: 65.7 % — SIGNIFICANT CHANGE UP (ref 43–77)
NEUTROPHILS NFR BLD AUTO: 65.7 % — SIGNIFICANT CHANGE UP (ref 43–77)
NITRITE UR-MCNC: NEGATIVE — SIGNIFICANT CHANGE UP
NITRITE UR-MCNC: NEGATIVE — SIGNIFICANT CHANGE UP
NRBC # BLD: 0 /100 WBCS — SIGNIFICANT CHANGE UP (ref 0–0)
NRBC # BLD: 0 /100 WBCS — SIGNIFICANT CHANGE UP (ref 0–0)
PH UR: 6 — SIGNIFICANT CHANGE UP (ref 5–8)
PH UR: 6 — SIGNIFICANT CHANGE UP (ref 5–8)
PLATELET # BLD AUTO: 202 K/UL — SIGNIFICANT CHANGE UP (ref 150–400)
PLATELET # BLD AUTO: 202 K/UL — SIGNIFICANT CHANGE UP (ref 150–400)
POTASSIUM SERPL-MCNC: 4.5 MMOL/L — SIGNIFICANT CHANGE UP (ref 3.5–5.3)
POTASSIUM SERPL-MCNC: 4.5 MMOL/L — SIGNIFICANT CHANGE UP (ref 3.5–5.3)
POTASSIUM SERPL-MCNC: 7.1 MMOL/L — CRITICAL HIGH (ref 3.5–5.3)
POTASSIUM SERPL-MCNC: 7.1 MMOL/L — CRITICAL HIGH (ref 3.5–5.3)
POTASSIUM SERPL-SCNC: 4.5 MMOL/L — SIGNIFICANT CHANGE UP (ref 3.5–5.3)
POTASSIUM SERPL-SCNC: 4.5 MMOL/L — SIGNIFICANT CHANGE UP (ref 3.5–5.3)
POTASSIUM SERPL-SCNC: 7.1 MMOL/L — CRITICAL HIGH (ref 3.5–5.3)
POTASSIUM SERPL-SCNC: 7.1 MMOL/L — CRITICAL HIGH (ref 3.5–5.3)
PROT SERPL-MCNC: 6.6 G/DL — SIGNIFICANT CHANGE UP (ref 6–8.3)
PROT SERPL-MCNC: 6.6 G/DL — SIGNIFICANT CHANGE UP (ref 6–8.3)
PROT SERPL-MCNC: 7.8 G/DL — SIGNIFICANT CHANGE UP (ref 6–8.3)
PROT SERPL-MCNC: 7.8 G/DL — SIGNIFICANT CHANGE UP (ref 6–8.3)
PROT UR-MCNC: NEGATIVE — SIGNIFICANT CHANGE UP
PROT UR-MCNC: NEGATIVE — SIGNIFICANT CHANGE UP
RBC # BLD: 5.38 M/UL — HIGH (ref 3.8–5.2)
RBC # BLD: 5.38 M/UL — HIGH (ref 3.8–5.2)
RBC # FLD: 14.9 % — HIGH (ref 10.3–14.5)
RBC # FLD: 14.9 % — HIGH (ref 10.3–14.5)
RBC CASTS # UR COMP ASSIST: 10 /HPF — HIGH (ref 0–4)
RBC CASTS # UR COMP ASSIST: 10 /HPF — HIGH (ref 0–4)
SODIUM SERPL-SCNC: 137 MMOL/L — SIGNIFICANT CHANGE UP (ref 135–145)
SODIUM SERPL-SCNC: 137 MMOL/L — SIGNIFICANT CHANGE UP (ref 135–145)
SODIUM SERPL-SCNC: 139 MMOL/L — SIGNIFICANT CHANGE UP (ref 135–145)
SODIUM SERPL-SCNC: 139 MMOL/L — SIGNIFICANT CHANGE UP (ref 135–145)
SP GR SPEC: 1.01 — SIGNIFICANT CHANGE UP (ref 1.01–1.02)
SP GR SPEC: 1.01 — SIGNIFICANT CHANGE UP (ref 1.01–1.02)
UROBILINOGEN FLD QL: NEGATIVE — SIGNIFICANT CHANGE UP
UROBILINOGEN FLD QL: NEGATIVE — SIGNIFICANT CHANGE UP
WBC # BLD: 7.75 K/UL — SIGNIFICANT CHANGE UP (ref 3.8–10.5)
WBC # BLD: 7.75 K/UL — SIGNIFICANT CHANGE UP (ref 3.8–10.5)
WBC # FLD AUTO: 7.75 K/UL — SIGNIFICANT CHANGE UP (ref 3.8–10.5)
WBC # FLD AUTO: 7.75 K/UL — SIGNIFICANT CHANGE UP (ref 3.8–10.5)
WBC UR QL: 0 /HPF — SIGNIFICANT CHANGE UP (ref 0–5)
WBC UR QL: 0 /HPF — SIGNIFICANT CHANGE UP (ref 0–5)

## 2023-10-28 PROCEDURE — 71275 CT ANGIOGRAPHY CHEST: CPT | Mod: 26,MA

## 2023-10-28 PROCEDURE — 99285 EMERGENCY DEPT VISIT HI MDM: CPT

## 2023-10-28 PROCEDURE — 36415 COLL VENOUS BLD VENIPUNCTURE: CPT

## 2023-10-28 PROCEDURE — 99285 EMERGENCY DEPT VISIT HI MDM: CPT | Mod: 25

## 2023-10-28 PROCEDURE — 96375 TX/PRO/DX INJ NEW DRUG ADDON: CPT | Mod: XU

## 2023-10-28 PROCEDURE — 85025 COMPLETE CBC W/AUTO DIFF WBC: CPT

## 2023-10-28 PROCEDURE — 71275 CT ANGIOGRAPHY CHEST: CPT | Mod: MA

## 2023-10-28 PROCEDURE — 80053 COMPREHEN METABOLIC PANEL: CPT

## 2023-10-28 PROCEDURE — 74174 CTA ABD&PLVS W/CONTRAST: CPT | Mod: MA

## 2023-10-28 PROCEDURE — 96374 THER/PROPH/DIAG INJ IV PUSH: CPT | Mod: XU

## 2023-10-28 PROCEDURE — 74174 CTA ABD&PLVS W/CONTRAST: CPT | Mod: 26,MA

## 2023-10-28 PROCEDURE — 81001 URINALYSIS AUTO W/SCOPE: CPT

## 2023-10-28 PROCEDURE — 87086 URINE CULTURE/COLONY COUNT: CPT

## 2023-10-28 RX ORDER — KETOROLAC TROMETHAMINE 30 MG/ML
15 SYRINGE (ML) INJECTION ONCE
Refills: 0 | Status: DISCONTINUED | OUTPATIENT
Start: 2023-10-28 | End: 2023-10-28

## 2023-10-28 RX ORDER — ACETAMINOPHEN 500 MG
1000 TABLET ORAL ONCE
Refills: 0 | Status: COMPLETED | OUTPATIENT
Start: 2023-10-28 | End: 2023-10-28

## 2023-10-28 RX ORDER — LIDOCAINE 4 G/100G
1 CREAM TOPICAL ONCE
Refills: 0 | Status: COMPLETED | OUTPATIENT
Start: 2023-10-28 | End: 2023-10-28

## 2023-10-28 RX ADMIN — Medication 15 MILLIGRAM(S): at 13:33

## 2023-10-28 RX ADMIN — Medication 400 MILLIGRAM(S): at 13:33

## 2023-10-28 RX ADMIN — LIDOCAINE 1 PATCH: 4 CREAM TOPICAL at 13:33

## 2023-10-28 NOTE — ED ADULT TRIAGE NOTE - CHIEF COMPLAINT QUOTE
L flank pain, L  buttock pain - radiating to L side of neck x 1 wk, denies trauma, +chronic hematuria, +took motrin with relief yesterday

## 2023-10-28 NOTE — ED ADULT NURSE NOTE - OBJECTIVE STATEMENT
64 yo female complaining of lower back pain, upper back pain and neck "this is an usual pain, I am worried because after covid I had too many complications" pt alerted and oriented x3, pain with movement and touch, vitals WNL, IV placed on left hand 20G, blood drawn, sent to lab., urine collected. Pt evaluated by MD, pending on imaging.

## 2023-10-28 NOTE — ED PROVIDER NOTE - OBJECTIVE STATEMENT
63-year-old female presenting due to back pain that started about 2 weeks ago.  The patient advocates that the pain started in her upper back rating into her neck 2 weeks ago and then she started having pain in her flank area 1 week ago.  She locates that the pains worsens with leaning forward and walking.  She has tried Motrin for the pain with no relief.  Patient also because she felt like she has trouble holding her urine but denies any saddle anesthesia or bowel incontinence.  Patient so application she feels as her left arm and leg are weaker than the right and has some cramping sensations/numbness to those areas.  Patient denies any chest pain, shortness of breath, nausea, vomiting, diarrhea, fevers, chills, history malignancy.  Patient has had history of spinal stenosis back surgery in 2018.  She advocates that she has never had pain like this in the past.

## 2023-10-28 NOTE — ED PROVIDER NOTE - ATTENDING CONTRIBUTION TO CARE
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  Patient denies any trauma or heavy lifting.  She reports she has developed a nonproductive cough.  No fevers, chills, nausea, vomiting.  Pain in her lower left paralumbar region does not radiate down her leg.  She denies any abdominal pain, dysuria.  She states that she  feels she is having difficulty holding her urine in time to get to the bathroom and is not sure because she has pain and is having difficulty with ambulating secondary to pain.  She states changing position is painful.  She reports she has never felt this in the past.    VS noted  Gen. no acute distress, Non toxic   HEENT: EOMI, mmm  Lungs: CTAB/L no C/ W /R   Spine: No midline C-T-L tenderness, no erythema, left parathoracic area with focal tenderness, left paralumbar region with focal tenderness, no skin changes  CVS: RRR, +2 RP bilaterally  Abd; Soft non tender, non distended   Ext: no edema  Skin: no rash  Neuro AAOx3, CN II-XII intact, strength 5/5 in UE/LE, clear speech  a/p:  left-sided upper back and lower back pain x1 to 2 weeks.  Patient reports pain in her back radiates to her neck and is now associated with left arm numbness over the past 2 days. Denies any chest pain, shortness of breath, abdominal pain.  Differential could be MSK pain, less likely dissection however patient reports radiation to her neck and associated left arm numbness.  These are concerning symptoms for possible dissection.  Given that she has pain in her upper and lower back, will get a CT aortic dissection study, labs, UA, give pain medication and reassess.  Pineda Blanton MD

## 2023-10-28 NOTE — ED PROVIDER NOTE - PATIENT PORTAL LINK FT
0 You can access the FollowMyHealth Patient Portal offered by [facility] by registering at the following website: [email or web address]. By joining [employer]’s FollowMyHealth portal, you will also be able to view your health information using other applications (apps) compatible with our system.

## 2023-10-28 NOTE — ED PROVIDER NOTE - CLINICAL SUMMARY MEDICAL DECISION MAKING FREE TEXT BOX
63-year-old female with past medical history of spinal stenosis and back surgeries in 2018 presenting due to back pain that started 2 weeks ago and upper back a while ago and lower back.  Patient endorses it worsens when bending forward and with walking.   patient denies any bowel incontinence or saddle anesthesia.  Likely MSK in origin due to pain being along the muscular groups and worsen with movements.  We will give ketorolac, Tylenol, and lidocaine patch.  Due to patient's new onset symptoms without traumatic cause and symptoms of weakness numbness to left arm and left leg, concern for aortic aneurysm although unlikely due to patient having no risk factors.  Will order CTA for aorta to evaluate.  Could also be kidney stone/pyonephritis due to CVA tenderness.  Will order CBC, CMP, urinalysis, and urine culture to evaluate for this.

## 2023-10-28 NOTE — ED PROVIDER NOTE - NSFOLLOWUPINSTRUCTIONS_ED_ALL_ED_FT
Back Pain    Back pain is very common in adults. The cause of back pain is rarely dangerous and the pain often gets better over time. The cause of your back pain may not be known and may include strain of muscles or ligaments, degeneration of the spinal disks, or arthritis. Occasionally the pain may radiate down your leg(s). Over-the-counter medicines to reduce pain and inflammation are often the most helpful. Stretching and remaining active frequently helps the healing process.     SEEK IMMEDIATE MEDICAL CARE IF YOU HAVE ANY OF THE FOLLOWING SYMPTOMS: bowel or bladder control problems, unusual weakness or numbness in your arms or legs, nausea or vomiting, abdominal pain, fever, dizziness/lightheadedness.    To control your pain at home, you should take Ibuprofen 400 mg along with Tylenol 650mg-1000mg every 6 to 8 hours. Limit your maximum daily Tylenol from all sources to 4000mg. Be aware that many other medications contain acetaminophen which is also known as Tylenol. Taking Tylenol and Ibuprofen together has been shown to be more effective at relieving pain than taking them separately. These are both over the counter medications that you can  at your local pharmacy without a prescription. You need to respect all of the warnings on the bottles. You shouldn’t take these medications for more than a week without following up with your doctor. Both medications come with certain risks and side effects that you need to discuss with your doctor, especially if you are taking them for a prolonged period.      Also use lidocaine patches to the affected areas for pain relief.  Please do not use lidocaine patches for more than 12-hour period within 24 hours.  Follow-up with your orthopedic surgeon on Monday for further evaluation and bring these discharge instructions with you as they have all the information from your stay.

## 2023-10-28 NOTE — ED PROVIDER NOTE - PROGRESS NOTE DETAILS
I spoke with the patient who advocated feeling better.  She said that the lidocaine patch helped her pain however not resolved completely.  I explained to her the results and she expressed understanding.  The patient advocate that she fill out enough to go home.  She expressed me that she has a follow-up appointment on Monday with orthopedic surgeon and I advised her to continue with appointment.  Tristen Slade MD (PGY 1) Patient signed out to me by the prior attending.  The patient's disposition was discussed with the treating team and agreed upon.  Eyal Morley M.D. (attending) patient with negative CTA c/a/p, no active chest pain or ACS equivalents, improved here in emergency department and will follow up closely as an outpatient.   The patient was serially evaluated throughout emergency department course by the team. There was no acute deterioration up to this time in the emergency department. The patient has demonstrated clinical improvement and/or stability, feels better at this time according to emergency department team. Agree with goals/plan of emergency department care as described in this physician's electronic medical record, including diagnostics, therapeutics and consultation recommendation as clinically warranted. Will discharge home with close outpatient follow up with primary care physician/provider and specialist if necessary. The patient and/or family was educated on expectant management and return precautions concerning signs and features to return to the emergency department, in layman terms, including but not limited to: nausea, vomiting, fever, chills, the inability to eat, take medications, or drink, persistent/worsening symptoms or any concerns at all. There are no acute or immediate life threatening issues present on history, clinical exam, or any diagnostic evaluation. The patient is a safe disposition home, has capacity and insight into their condition, is ambulatory in the Emergency Department with no further questions and will follow up with their doctor(s) this week. Diagnosis, prognosis, natural history and treatment was discussed with patient and/or family. The patient and/or family were given the opportunity to ask questions and have them answered in full. The patient and/or family are with capacity and insight into the situation, treatment, risks, benefits, alternative therapies, and understand that they can ask any further questions if needed. Patient and/or family/guardian understands anticipatory guidance and was given strict return and follow up precautions. The patient and/or family/guardian has been informed of the necessity to follow up with the PMD/Clinic/follow up as provided within 2-3 days, and the patient and/or family/guardian reports understanding of above with capacity and insight. The patient and/or family/guardian were informed of any results of their tests and are were encouraged to follow up on the findings with their doctor as well as the need to inform their doctor of any results. The patient and/or family/guardian are aware of the need to follow up with repeat testing as applicable and report understanding of the above with capacity and insight. The patient and/or family/guardian was made aware of any pending test results at the time of discharge and of the need to call back for the final results as well as the need to inform their doctor of the results.

## 2023-10-28 NOTE — ED ADULT NURSE NOTE - NSFALLUNIVINTERV_ED_ALL_ED
Bed/Stretcher in lowest position, wheels locked, appropriate side rails in place/Call bell, personal items and telephone in reach/Instruct patient to call for assistance before getting out of bed/chair/stretcher/Non-slip footwear applied when patient is off stretcher/[place] to call system/Physically safe environment - no spills, clutter or unnecessary equipment/Purposeful proactive rounding/Room/bathroom lighting operational, light cord in reach

## 2023-10-28 NOTE — ED PROVIDER NOTE - PHYSICAL EXAMINATION
MSK: pain of the L. CVA. no midline spinal tenderness.   Neuro: equal strength and sensation bilaterally of the upper and lower extremities.

## 2023-10-29 LAB
CULTURE RESULTS: SIGNIFICANT CHANGE UP
CULTURE RESULTS: SIGNIFICANT CHANGE UP
SPECIMEN SOURCE: SIGNIFICANT CHANGE UP
SPECIMEN SOURCE: SIGNIFICANT CHANGE UP

## 2023-10-31 ENCOUNTER — NON-APPOINTMENT (OUTPATIENT)
Age: 63
End: 2023-10-31

## 2023-11-01 ENCOUNTER — NON-APPOINTMENT (OUTPATIENT)
Age: 63
End: 2023-11-01

## 2023-11-01 ENCOUNTER — APPOINTMENT (OUTPATIENT)
Dept: ORTHOPEDIC SURGERY | Facility: CLINIC | Age: 63
End: 2023-11-01
Payer: COMMERCIAL

## 2023-11-01 VITALS
HEART RATE: 80 BPM | DIASTOLIC BLOOD PRESSURE: 79 MMHG | BODY MASS INDEX: 34.41 KG/M2 | HEIGHT: 62 IN | SYSTOLIC BLOOD PRESSURE: 118 MMHG | WEIGHT: 187 LBS

## 2023-11-01 DIAGNOSIS — M43.10 SPONDYLOLISTHESIS, SITE UNSPECIFIED: ICD-10-CM

## 2023-11-01 DIAGNOSIS — M48.061 SPINAL STENOSIS, LUMBAR REGION WITHOUT NEUROGENIC CLAUDICATION: ICD-10-CM

## 2023-11-01 DIAGNOSIS — M51.37 OTHER INTERVERTEBRAL DISC DEGENERATION, LUMBOSACRAL REGION: ICD-10-CM

## 2023-11-01 PROCEDURE — 72070 X-RAY EXAM THORAC SPINE 2VWS: CPT

## 2023-11-01 PROCEDURE — 99214 OFFICE O/P EST MOD 30 MIN: CPT

## 2023-11-01 PROCEDURE — 72110 X-RAY EXAM L-2 SPINE 4/>VWS: CPT

## 2023-11-01 RX ORDER — IBUPROFEN 600 MG/1
600 TABLET, FILM COATED ORAL 3 TIMES DAILY
Qty: 30 | Refills: 0 | Status: ACTIVE | COMMUNITY
Start: 2023-11-01 | End: 1900-01-01

## 2023-11-17 PROBLEM — M51.37 DISC DEGENERATION, LUMBOSACRAL: Status: ACTIVE | Noted: 2021-04-12

## 2023-11-17 PROBLEM — M43.10 DEGENERATIVE SPONDYLOLISTHESIS: Status: ACTIVE | Noted: 2021-04-12

## 2023-11-27 ENCOUNTER — APPOINTMENT (OUTPATIENT)
Dept: CT IMAGING | Facility: IMAGING CENTER | Age: 63
End: 2023-11-27

## 2023-11-30 ENCOUNTER — APPOINTMENT (OUTPATIENT)
Dept: PULMONOLOGY | Facility: CLINIC | Age: 63
End: 2023-11-30

## 2024-05-09 ENCOUNTER — EMERGENCY (EMERGENCY)
Facility: HOSPITAL | Age: 64
LOS: 1 days | Discharge: HOME | End: 2024-05-09
Attending: STUDENT IN AN ORGANIZED HEALTH CARE EDUCATION/TRAINING PROGRAM
Payer: COMMERCIAL

## 2024-05-09 VITALS
WEIGHT: 179.9 LBS | TEMPERATURE: 99 F | OXYGEN SATURATION: 98 % | SYSTOLIC BLOOD PRESSURE: 137 MMHG | HEART RATE: 111 BPM | RESPIRATION RATE: 16 BRPM | HEIGHT: 62 IN | DIASTOLIC BLOOD PRESSURE: 73 MMHG

## 2024-05-09 DIAGNOSIS — Z90.49 ACQUIRED ABSENCE OF OTHER SPECIFIED PARTS OF DIGESTIVE TRACT: Chronic | ICD-10-CM

## 2024-05-09 PROCEDURE — 99285 EMERGENCY DEPT VISIT HI MDM: CPT

## 2024-05-10 ENCOUNTER — NON-APPOINTMENT (OUTPATIENT)
Age: 64
End: 2024-05-10

## 2024-05-10 VITALS
HEART RATE: 85 BPM | OXYGEN SATURATION: 98 % | DIASTOLIC BLOOD PRESSURE: 72 MMHG | RESPIRATION RATE: 16 BRPM | TEMPERATURE: 98 F | SYSTOLIC BLOOD PRESSURE: 142 MMHG

## 2024-05-10 LAB
ALBUMIN SERPL ELPH-MCNC: 3.8 G/DL — SIGNIFICANT CHANGE UP (ref 3.3–5)
ALP SERPL-CCNC: 68 U/L — SIGNIFICANT CHANGE UP (ref 40–120)
ALT FLD-CCNC: 24 U/L — SIGNIFICANT CHANGE UP (ref 10–45)
ANION GAP SERPL CALC-SCNC: 12 MMOL/L — SIGNIFICANT CHANGE UP (ref 5–17)
APPEARANCE UR: CLEAR — SIGNIFICANT CHANGE UP
AST SERPL-CCNC: 20 U/L — SIGNIFICANT CHANGE UP (ref 10–40)
BACTERIA # UR AUTO: NEGATIVE /HPF — SIGNIFICANT CHANGE UP
BASE EXCESS BLDV CALC-SCNC: 1.2 MMOL/L — SIGNIFICANT CHANGE UP (ref -2–3)
BASOPHILS # BLD AUTO: 0.05 K/UL — SIGNIFICANT CHANGE UP (ref 0–0.2)
BASOPHILS NFR BLD AUTO: 0.5 % — SIGNIFICANT CHANGE UP (ref 0–2)
BILIRUB SERPL-MCNC: 0.4 MG/DL — SIGNIFICANT CHANGE UP (ref 0.2–1.2)
BILIRUB UR-MCNC: NEGATIVE — SIGNIFICANT CHANGE UP
BUN SERPL-MCNC: 17 MG/DL — SIGNIFICANT CHANGE UP (ref 7–23)
CA-I SERPL-SCNC: 1.29 MMOL/L — SIGNIFICANT CHANGE UP (ref 1.15–1.33)
CALCIUM SERPL-MCNC: 9.7 MG/DL — SIGNIFICANT CHANGE UP (ref 8.4–10.5)
CAST: 0 /LPF — SIGNIFICANT CHANGE UP (ref 0–4)
CHLORIDE BLDV-SCNC: 104 MMOL/L — SIGNIFICANT CHANGE UP (ref 96–108)
CHLORIDE SERPL-SCNC: 105 MMOL/L — SIGNIFICANT CHANGE UP (ref 96–108)
CO2 BLDV-SCNC: 29 MMOL/L — HIGH (ref 22–26)
CO2 SERPL-SCNC: 23 MMOL/L — SIGNIFICANT CHANGE UP (ref 22–31)
COLOR SPEC: YELLOW — SIGNIFICANT CHANGE UP
CREAT SERPL-MCNC: 0.76 MG/DL — SIGNIFICANT CHANGE UP (ref 0.5–1.3)
DIFF PNL FLD: ABNORMAL
EGFR: 87 ML/MIN/1.73M2 — SIGNIFICANT CHANGE UP
EOSINOPHIL # BLD AUTO: 0.1 K/UL — SIGNIFICANT CHANGE UP (ref 0–0.5)
EOSINOPHIL NFR BLD AUTO: 1 % — SIGNIFICANT CHANGE UP (ref 0–6)
GAS PNL BLDV: 135 MMOL/L — LOW (ref 136–145)
GAS PNL BLDV: SIGNIFICANT CHANGE UP
GAS PNL BLDV: SIGNIFICANT CHANGE UP
GLUCOSE BLDV-MCNC: 100 MG/DL — HIGH (ref 70–99)
GLUCOSE SERPL-MCNC: 99 MG/DL — SIGNIFICANT CHANGE UP (ref 70–99)
GLUCOSE UR QL: NEGATIVE MG/DL — SIGNIFICANT CHANGE UP
HCO3 BLDV-SCNC: 28 MMOL/L — SIGNIFICANT CHANGE UP (ref 22–29)
HCT VFR BLD CALC: 40.1 % — SIGNIFICANT CHANGE UP (ref 34.5–45)
HCT VFR BLDA CALC: 39 % — SIGNIFICANT CHANGE UP (ref 34.5–46.5)
HGB BLD CALC-MCNC: 13.1 G/DL — SIGNIFICANT CHANGE UP (ref 11.7–16.1)
HGB BLD-MCNC: 12.8 G/DL — SIGNIFICANT CHANGE UP (ref 11.5–15.5)
IMM GRANULOCYTES NFR BLD AUTO: 0.4 % — SIGNIFICANT CHANGE UP (ref 0–0.9)
KETONES UR-MCNC: NEGATIVE MG/DL — SIGNIFICANT CHANGE UP
LACTATE BLDV-MCNC: 1.5 MMOL/L — SIGNIFICANT CHANGE UP (ref 0.5–2)
LEUKOCYTE ESTERASE UR-ACNC: ABNORMAL
LIDOCAIN IGE QN: 28 U/L — SIGNIFICANT CHANGE UP (ref 7–60)
LYMPHOCYTES # BLD AUTO: 2.22 K/UL — SIGNIFICANT CHANGE UP (ref 1–3.3)
LYMPHOCYTES # BLD AUTO: 22.1 % — SIGNIFICANT CHANGE UP (ref 13–44)
MCHC RBC-ENTMCNC: 25.3 PG — LOW (ref 27–34)
MCHC RBC-ENTMCNC: 31.9 GM/DL — LOW (ref 32–36)
MCV RBC AUTO: 79.2 FL — LOW (ref 80–100)
MONOCYTES # BLD AUTO: 1.12 K/UL — HIGH (ref 0–0.9)
MONOCYTES NFR BLD AUTO: 11.1 % — SIGNIFICANT CHANGE UP (ref 2–14)
NEUTROPHILS # BLD AUTO: 6.53 K/UL — SIGNIFICANT CHANGE UP (ref 1.8–7.4)
NEUTROPHILS NFR BLD AUTO: 64.9 % — SIGNIFICANT CHANGE UP (ref 43–77)
NITRITE UR-MCNC: NEGATIVE — SIGNIFICANT CHANGE UP
NRBC # BLD: 0 /100 WBCS — SIGNIFICANT CHANGE UP (ref 0–0)
NT-PROBNP SERPL-SCNC: 226 PG/ML — SIGNIFICANT CHANGE UP (ref 0–300)
PCO2 BLDV: 50 MMHG — HIGH (ref 39–42)
PH BLDV: 7.35 — SIGNIFICANT CHANGE UP (ref 7.32–7.43)
PH UR: 5.5 — SIGNIFICANT CHANGE UP (ref 5–8)
PLATELET # BLD AUTO: 198 K/UL — SIGNIFICANT CHANGE UP (ref 150–400)
PO2 BLDV: 21 MMHG — LOW (ref 25–45)
POTASSIUM BLDV-SCNC: 4.9 MMOL/L — SIGNIFICANT CHANGE UP (ref 3.5–5.1)
POTASSIUM SERPL-MCNC: 4.6 MMOL/L — SIGNIFICANT CHANGE UP (ref 3.5–5.3)
POTASSIUM SERPL-SCNC: 4.6 MMOL/L — SIGNIFICANT CHANGE UP (ref 3.5–5.3)
PROCALCITONIN SERPL-MCNC: 0.04 NG/ML — SIGNIFICANT CHANGE UP (ref 0.02–0.1)
PROT SERPL-MCNC: 6.8 G/DL — SIGNIFICANT CHANGE UP (ref 6–8.3)
PROT UR-MCNC: NEGATIVE MG/DL — SIGNIFICANT CHANGE UP
RBC # BLD: 5.06 M/UL — SIGNIFICANT CHANGE UP (ref 3.8–5.2)
RBC # FLD: 13.5 % — SIGNIFICANT CHANGE UP (ref 10.3–14.5)
RBC CASTS # UR COMP ASSIST: 6 /HPF — HIGH (ref 0–4)
REVIEW: SIGNIFICANT CHANGE UP
SAO2 % BLDV: 24.2 % — LOW (ref 67–88)
SODIUM SERPL-SCNC: 140 MMOL/L — SIGNIFICANT CHANGE UP (ref 135–145)
SP GR SPEC: 1.02 — SIGNIFICANT CHANGE UP (ref 1–1.03)
SQUAMOUS # UR AUTO: 0 /HPF — SIGNIFICANT CHANGE UP (ref 0–5)
TROPONIN T, HIGH SENSITIVITY RESULT: <6 NG/L — SIGNIFICANT CHANGE UP (ref 0–51)
UROBILINOGEN FLD QL: 1 MG/DL — SIGNIFICANT CHANGE UP (ref 0.2–1)
WBC # BLD: 10.06 K/UL — SIGNIFICANT CHANGE UP (ref 3.8–10.5)
WBC # FLD AUTO: 10.06 K/UL — SIGNIFICANT CHANGE UP (ref 3.8–10.5)
WBC UR QL: 3 /HPF — SIGNIFICANT CHANGE UP (ref 0–5)

## 2024-05-10 PROCEDURE — 82947 ASSAY GLUCOSE BLOOD QUANT: CPT

## 2024-05-10 PROCEDURE — 82330 ASSAY OF CALCIUM: CPT

## 2024-05-10 PROCEDURE — 83690 ASSAY OF LIPASE: CPT

## 2024-05-10 PROCEDURE — 96374 THER/PROPH/DIAG INJ IV PUSH: CPT | Mod: XU

## 2024-05-10 PROCEDURE — 82435 ASSAY OF BLOOD CHLORIDE: CPT

## 2024-05-10 PROCEDURE — 74177 CT ABD & PELVIS W/CONTRAST: CPT | Mod: 26,MC

## 2024-05-10 PROCEDURE — 81001 URINALYSIS AUTO W/SCOPE: CPT

## 2024-05-10 PROCEDURE — 76705 ECHO EXAM OF ABDOMEN: CPT

## 2024-05-10 PROCEDURE — 99285 EMERGENCY DEPT VISIT HI MDM: CPT | Mod: 25

## 2024-05-10 PROCEDURE — 80053 COMPREHEN METABOLIC PANEL: CPT

## 2024-05-10 PROCEDURE — 84145 PROCALCITONIN (PCT): CPT

## 2024-05-10 PROCEDURE — 87086 URINE CULTURE/COLONY COUNT: CPT

## 2024-05-10 PROCEDURE — 76770 US EXAM ABDO BACK WALL COMP: CPT

## 2024-05-10 PROCEDURE — 83605 ASSAY OF LACTIC ACID: CPT

## 2024-05-10 PROCEDURE — 96361 HYDRATE IV INFUSION ADD-ON: CPT | Mod: XU

## 2024-05-10 PROCEDURE — 76705 ECHO EXAM OF ABDOMEN: CPT | Mod: 26

## 2024-05-10 PROCEDURE — 93970 EXTREMITY STUDY: CPT

## 2024-05-10 PROCEDURE — 93005 ELECTROCARDIOGRAM TRACING: CPT

## 2024-05-10 PROCEDURE — 93970 EXTREMITY STUDY: CPT | Mod: 26

## 2024-05-10 PROCEDURE — 85018 HEMOGLOBIN: CPT

## 2024-05-10 PROCEDURE — 84295 ASSAY OF SERUM SODIUM: CPT

## 2024-05-10 PROCEDURE — 84132 ASSAY OF SERUM POTASSIUM: CPT

## 2024-05-10 PROCEDURE — 71275 CT ANGIOGRAPHY CHEST: CPT | Mod: 26,MC

## 2024-05-10 PROCEDURE — 71275 CT ANGIOGRAPHY CHEST: CPT | Mod: MC

## 2024-05-10 PROCEDURE — 82803 BLOOD GASES ANY COMBINATION: CPT

## 2024-05-10 PROCEDURE — 74177 CT ABD & PELVIS W/CONTRAST: CPT | Mod: MC

## 2024-05-10 PROCEDURE — 76770 US EXAM ABDO BACK WALL COMP: CPT | Mod: 26

## 2024-05-10 PROCEDURE — 85025 COMPLETE CBC W/AUTO DIFF WBC: CPT

## 2024-05-10 PROCEDURE — 85014 HEMATOCRIT: CPT

## 2024-05-10 PROCEDURE — 83880 ASSAY OF NATRIURETIC PEPTIDE: CPT

## 2024-05-10 PROCEDURE — 84484 ASSAY OF TROPONIN QUANT: CPT

## 2024-05-10 RX ORDER — KETOROLAC TROMETHAMINE 30 MG/ML
15 SYRINGE (ML) INJECTION ONCE
Refills: 0 | Status: DISCONTINUED | OUTPATIENT
Start: 2024-05-10 | End: 2024-05-10

## 2024-05-10 RX ORDER — SODIUM CHLORIDE 9 MG/ML
1000 INJECTION INTRAMUSCULAR; INTRAVENOUS; SUBCUTANEOUS ONCE
Refills: 0 | Status: COMPLETED | OUTPATIENT
Start: 2024-05-10 | End: 2024-05-10

## 2024-05-10 RX ORDER — ACETAMINOPHEN 500 MG
1000 TABLET ORAL ONCE
Refills: 0 | Status: COMPLETED | OUTPATIENT
Start: 2024-05-10 | End: 2024-05-10

## 2024-05-10 RX ADMIN — SODIUM CHLORIDE 1000 MILLILITER(S): 9 INJECTION INTRAMUSCULAR; INTRAVENOUS; SUBCUTANEOUS at 01:05

## 2024-05-10 RX ADMIN — Medication 400 MILLIGRAM(S): at 01:05

## 2024-05-10 RX ADMIN — Medication 15 MILLIGRAM(S): at 05:26

## 2024-05-10 NOTE — ED PROVIDER NOTE - NS ED ROS FT
CONSTITUTIONAL: No fever or chill  HEENT: Denies changes in vision and hearing.  RESPIRATORY:  exertional shortness of breath, chronic cough, unchanged  CV: Deneis CP.   GI:  positive abdominal pain, denies nausea, vomiting and diarrhea.  : Denies dysuria and urinary frequency.  MSK: Denies myalgia and joint pain.  SKIN: Denies rash   NEUROLOGICAL:  lightheadedness

## 2024-05-10 NOTE — ED PROVIDER NOTE - ATTENDING CONTRIBUTION TO CARE
Attending (Jimbo Kwan D.O.):  I have personally seen and examined this patient. I have performed a substantive portion of the visit including all aspects of the medical decision making. Resident, fellow, student, and/or ACP note reviewed. I agree on the plan of care except where noted.

## 2024-05-10 NOTE — ED PROVIDER NOTE - CLINICAL SUMMARY MEDICAL DECISION MAKING FREE TEXT BOX
63-year-old female with past medical history of hyperlipidemia, here for right-sided abdominal pain.  Patient also noticed left ankle swelling   Starting  a week ago with exertional shortness of breath starting today.  right-sided abdominal pain, concern for  cholecystitis, lower concern for pancreatitis due to location.  Also lower concern for pancreatitis as patient is not vomiting.  Will order baseline labs and right-sided upper quadrant ultrasound for this.   For leg swelling, some concern for DVT, although patient has no recent travel, no recent surgery, and no recent hospitalization, not on hormonal treatment.  Will get duplex.  Pending result, plus or minus CT chest abdomen and pelvis.  Dispo pending workup.

## 2024-05-10 NOTE — ED PROVIDER NOTE - NSFOLLOWUPINSTRUCTIONS_ED_ALL_ED_FT
You come to the emergency department for right-sided abdominal pain.    lab work does not show pancreatitis.  Your ultrasound does not show gallbladder inflammation.   As for your leg swelling and shortness of breath, your leg scan does not show a blood clot.  Your chest skin does not show a blood clot in your lung.     However, your CT scan showed that you have irregular wall thickening at the base of your cecum concerning for neoplasm.   I have message cancer care direct to expedite your follow-up.  Please make sure you follow-up with your own GI doctor or your primary care for this.  Because if this is cancer, we do not want to delay your treatment and diagnosis.    You can use 500-1000mg Tylenol every 6 hours for pain - as needed; maximal daily dose 3000mg.  This is an over-the-counter medications - please respect the warnings on the label. This medication come with certain risks and side effects that you need to discuss with your doctor, especially if you are taking it for a prolonged period.    You can use 400mg Ibuprofen (such as motrin or advil) every 6 to 8 hours as needed for pain control.  Take ibuprofen with food or milk to lessen stomach upset.

## 2024-05-10 NOTE — ED ADULT NURSE NOTE - OBJECTIVE STATEMENT
62YO female with pmhx of high cholesterol comes to the ED with c/o abdominal pain. Pt states having 2 days of general weakness, over the weekend pt noticed b/l ankles were swollen as of right now only the left ankle is swollen, and 1 day of abdominal pain pt woke up with left lower quadrant abdominal pain, pain increases during deep inhalation and coughing. Pt states took her temp at home reading 99.9F. Pt is A&Ox4, respirations are even and nonlabored, radial pulses are strong and equal, pedal pulses are strong and equal, left ankle is swollen and non-edematous. Pt denies CP, nausea, vomiting, diarrhea, constipation. Friend at bedside. Pt placed in position of comfort. Bed in lowest position, wheels locked, appropriate side rails raised. Pt denies needs at this time.

## 2024-05-10 NOTE — ED PROVIDER NOTE - OBJECTIVE STATEMENT
63-year-old female with past medical history of hyperlipidemia appendectomy, here for right-sided abdominal pain.   Patient noticed left ankle swelling about a week ago.  Earlier today, patient  woke up with right-sided abdominal pain.  Patient also noted exertional shortness of breath, which patient does not have at baseline.  She also report 1 episode of lightheadedness for lasting around 3-minute while she was shopping.   Right-sided abdominal pain is worsened with cough, taking deep breath, or sneezing.   No fever, no chill, no nausea, no vomiting, no dysuria

## 2024-05-10 NOTE — ED PROVIDER NOTE - PROGRESS NOTE DETAILS
Dayday Arias) [facility] PGY-2: Reassessed patient.  Patient states that her pain improved after Tylenol, however is back.  Therefore we will give a dose of Toradol.  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  Patient last colonoscopy was 5 years ago.   patient understands that she will require close follow-up.  Will send message through cancer care direct.

## 2024-05-10 NOTE — ED PROVIDER NOTE - NSFOLLOWUPCLINICS_GEN_ALL_ED_FT
Gastroenterology at [facility]  Gastroenterology  [address] [place] [address]  Phone: [phone]  Fax:     [facility]  Gastroenterology  [address] [place] [address]  Phone: [phone]  Fax:     [facility]  Gastroenterology  [address]  [place] [address]  Phone: [phone]  Fax:

## 2024-05-10 NOTE — ED PROVIDER NOTE - PATIENT PORTAL LINK FT
You can access the FollowMyHealth Patient Portal offered by [facility] by registering at the following website: [email or web address]. By joining [employer]’s FollowMyHealth portal, you will also be able to view your health information using other applications (apps) compatible with our system.

## 2024-05-11 LAB
CULTURE RESULTS: SIGNIFICANT CHANGE UP
SPECIMEN SOURCE: SIGNIFICANT CHANGE UP

## 2024-05-13 ENCOUNTER — NON-APPOINTMENT (OUTPATIENT)
Age: 64
End: 2024-05-13

## 2024-05-20 ENCOUNTER — APPOINTMENT (OUTPATIENT)
Dept: GASTROENTEROLOGY | Facility: CLINIC | Age: 64
End: 2024-05-20
Payer: COMMERCIAL

## 2024-05-20 VITALS
HEIGHT: 62 IN | RESPIRATION RATE: 16 BRPM | WEIGHT: 180 LBS | OXYGEN SATURATION: 98 % | TEMPERATURE: 98.2 F | BODY MASS INDEX: 33.13 KG/M2 | HEART RATE: 87 BPM | DIASTOLIC BLOOD PRESSURE: 71 MMHG | SYSTOLIC BLOOD PRESSURE: 111 MMHG

## 2024-05-20 DIAGNOSIS — Z82.49 FAMILY HISTORY OF ISCHEMIC HEART DISEASE AND OTHER DISEASES OF THE CIRCULATORY SYSTEM: ICD-10-CM

## 2024-05-20 DIAGNOSIS — R10.11 RIGHT UPPER QUADRANT PAIN: ICD-10-CM

## 2024-05-20 DIAGNOSIS — Z86.39 PERSONAL HISTORY OF OTHER ENDOCRINE, NUTRITIONAL AND METABOLIC DISEASE: ICD-10-CM

## 2024-05-20 PROCEDURE — 99204 OFFICE O/P NEW MOD 45 MIN: CPT

## 2024-05-20 RX ORDER — TIZANIDINE 2 MG/1
2 TABLET ORAL EVERY 8 HOURS
Qty: 30 | Refills: 0 | Status: DISCONTINUED | COMMUNITY
Start: 2023-11-01 | End: 2024-05-20

## 2024-05-20 RX ORDER — METOPROLOL SUCCINATE 25 MG/1
25 TABLET, EXTENDED RELEASE ORAL
Qty: 90 | Refills: 0 | Status: DISCONTINUED | COMMUNITY
Start: 2020-12-29 | End: 2024-05-20

## 2024-05-20 NOTE — PHYSICAL EXAM
[Alert] : alert [Normal Voice/Communication] : normal voice/communication [Healthy Appearing] : healthy appearing [No Acute Distress] : no acute distress [Sclera] : the sclera and conjunctiva were normal [Hearing Threshold Finger Rub Not Swisher] : hearing was normal [Normal Lips/Gums] : the lips and gums were normal [Oropharynx] : the oropharynx was normal [Normal Appearance] : the appearance of the neck was normal [No Neck Mass] : no neck mass was observed [No Respiratory Distress] : no respiratory distress [No Acc Muscle Use] : no accessory muscle use [Respiration, Rhythm And Depth] : normal respiratory rhythm and effort [Heart Rate And Rhythm] : heart rate was normal and rhythm regular [Auscultation Breath Sounds / Voice Sounds] : lungs were clear to auscultation bilaterally [Normal S1, S2] : normal S1 and S2 [Murmurs] : no murmurs [+1] : edema: +1 [Bowel Sounds] : normal bowel sounds [Abdomen Tenderness] : non-tender [No Masses] : no abdominal mass palpated [Abdomen Soft] : soft [Cervical Lymph Nodes Enlarged Posterior Bilaterally] : no posterior cervical lymphadenopathy [Supraclavicular Lymph Nodes Enlarged Bilaterally] : no supraclavicular lymphadenopathy [Cervical Lymph Nodes Enlarged Anterior Bilaterally] : no anterior cervical lymphadenopathy [No Spinal Tenderness] : no spinal tenderness [No Clubbing, Cyanosis] : no clubbing or cyanosis of the fingernails [Abnormal Walk] : normal gait [Normal Color / Pigmentation] : normal skin color and pigmentation [] : no rash [Oriented To Time, Place, And Person] : oriented to person, place, and time

## 2024-05-20 NOTE — HISTORY OF PRESENT ILLNESS
[FreeTextEntry1] : Dee Jurado is a 63-year-old [facility] woman came for consultation for CT scan showing irregular thickening of the cecum.   She developed right lower quadrant abdominal pain, fever and chills and lightheadedness which made her to go to emergency room at [facility] had CT scan of the abdomen/pelvis She has history of diverticulitis in the past, she had colonoscopy by 6 years ago by  , reported as normal She also started having pedal edema for the past 2 weeks, however she denied chest pain, palpitation, dyspnea on exertion or shortness of breath She was seen by her cardiologist in April, reportedly normal examination and she is scheduled for echocardiogram in July

## 2024-05-20 NOTE — ASSESSMENT
[FreeTextEntry1] : Dee Richmond is a 63-year-old female had a CT scan which showed irregular thickening in the cecum probably diverticulitis or incomplete distention.  However given the finding I recommended colonoscopy RBA and bowel prep explained Advised her to call her cardiologist and get an echocardiogram soon for evaluation of pedal edema

## 2024-05-22 ENCOUNTER — NON-APPOINTMENT (OUTPATIENT)
Age: 64
End: 2024-05-22

## 2024-06-04 DIAGNOSIS — R93.3 ABNORMAL FINDINGS ON DIAGNOSTIC IMAGING OF OTHER PARTS OF DIGESTIVE TRACT: ICD-10-CM

## 2024-06-04 DIAGNOSIS — R93.89 ABNORMAL FINDINGS ON DIAGNOSTIC IMAGING OF OTHER SPECIFIED BODY STRUCTURES: ICD-10-CM

## 2024-06-04 RX ORDER — SODIUM SULFATE, POTASSIUM SULFATE AND MAGNESIUM SULFATE 1.6; 3.13; 17.5 G/177ML; G/177ML; G/177ML
17.5-3.13-1.6 SOLUTION ORAL
Qty: 2 | Refills: 0 | Status: ACTIVE | COMMUNITY
Start: 2024-06-04 | End: 1900-01-01

## 2024-06-06 ENCOUNTER — APPOINTMENT (OUTPATIENT)
Dept: GASTROENTEROLOGY | Facility: AMBULATORY MEDICAL SERVICES | Age: 64
End: 2024-06-06
Payer: COMMERCIAL

## 2024-06-06 PROCEDURE — 45380 COLONOSCOPY AND BIOPSY: CPT | Mod: 59

## 2024-06-06 PROCEDURE — 45385 COLONOSCOPY W/LESION REMOVAL: CPT

## 2024-06-11 ENCOUNTER — NON-APPOINTMENT (OUTPATIENT)
Age: 64
End: 2024-06-11

## 2024-07-03 ENCOUNTER — APPOINTMENT (OUTPATIENT)
Dept: GASTROENTEROLOGY | Facility: CLINIC | Age: 64
End: 2024-07-03
Payer: COMMERCIAL

## 2024-07-03 VITALS
OXYGEN SATURATION: 96 % | SYSTOLIC BLOOD PRESSURE: 119 MMHG | TEMPERATURE: 98.2 F | HEART RATE: 84 BPM | WEIGHT: 172 LBS | HEIGHT: 62 IN | DIASTOLIC BLOOD PRESSURE: 74 MMHG | RESPIRATION RATE: 16 BRPM | BODY MASS INDEX: 31.65 KG/M2

## 2024-07-03 DIAGNOSIS — K63.3 ULCER OF INTESTINE: ICD-10-CM

## 2024-07-03 PROCEDURE — 99214 OFFICE O/P EST MOD 30 MIN: CPT

## 2024-07-03 RX ORDER — SODIUM PICOSULFATE, MAGNESIUM OXIDE, AND ANHYDROUS CITRIC ACID 12; 3.5; 1 G/175ML; G/175ML; MG/175ML
10-3.5-12 MG-GM LIQUID ORAL
Qty: 2 | Refills: 0 | Status: ACTIVE | COMMUNITY
Start: 2024-07-03 | End: 1900-01-01

## 2024-07-09 RX ORDER — SODIUM SULFATE, POTASSIUM SULFATE AND MAGNESIUM SULFATE 1.6; 3.13; 17.5 G/177ML; G/177ML; G/177ML
17.5-3.13-1.6 SOLUTION ORAL
Qty: 2 | Refills: 0 | Status: ACTIVE | COMMUNITY
Start: 2024-07-09 | End: 1900-01-01

## 2024-12-09 ENCOUNTER — APPOINTMENT (OUTPATIENT)
Dept: GASTROENTEROLOGY | Facility: AMBULATORY MEDICAL SERVICES | Age: 64
End: 2024-12-09
Payer: MEDICARE

## 2024-12-09 PROCEDURE — 45380 COLONOSCOPY AND BIOPSY: CPT

## 2025-01-22 ENCOUNTER — APPOINTMENT (OUTPATIENT)
Dept: GASTROENTEROLOGY | Facility: CLINIC | Age: 65
End: 2025-01-22
Payer: MEDICARE

## 2025-01-22 VITALS
HEART RATE: 73 BPM | OXYGEN SATURATION: 96 % | DIASTOLIC BLOOD PRESSURE: 78 MMHG | TEMPERATURE: 98.5 F | BODY MASS INDEX: 32.2 KG/M2 | HEIGHT: 62 IN | SYSTOLIC BLOOD PRESSURE: 124 MMHG | WEIGHT: 175 LBS | RESPIRATION RATE: 16 BRPM

## 2025-01-22 PROCEDURE — 99213 OFFICE O/P EST LOW 20 MIN: CPT

## 2025-02-03 ENCOUNTER — APPOINTMENT (OUTPATIENT)
Dept: ORTHOPEDIC SURGERY | Facility: CLINIC | Age: 65
End: 2025-02-03
Payer: MEDICARE

## 2025-02-03 DIAGNOSIS — M48.061 SPINAL STENOSIS, LUMBAR REGION WITHOUT NEUROGENIC CLAUDICATION: ICD-10-CM

## 2025-02-03 DIAGNOSIS — M54.16 RADICULOPATHY, LUMBAR REGION: ICD-10-CM

## 2025-02-03 DIAGNOSIS — Z98.890 OTHER SPECIFIED POSTPROCEDURAL STATES: ICD-10-CM

## 2025-02-03 DIAGNOSIS — M43.10 SPONDYLOLISTHESIS, SITE UNSPECIFIED: ICD-10-CM

## 2025-02-03 PROCEDURE — 99204 OFFICE O/P NEW MOD 45 MIN: CPT

## 2025-02-03 RX ORDER — GABAPENTIN 100 MG/1
100 CAPSULE ORAL
Qty: 30 | Refills: 2 | Status: ACTIVE | COMMUNITY
Start: 2025-02-03 | End: 1900-01-01

## 2025-02-03 RX ORDER — METHYLPREDNISOLONE 4 MG/1
4 TABLET ORAL
Qty: 1 | Refills: 0 | Status: ACTIVE | COMMUNITY
Start: 2025-02-03 | End: 1900-01-01

## 2025-02-24 ENCOUNTER — OUTPATIENT (OUTPATIENT)
Dept: OUTPATIENT SERVICES | Facility: HOSPITAL | Age: 65
LOS: 1 days | End: 2025-02-24
Payer: MEDICARE

## 2025-02-24 ENCOUNTER — APPOINTMENT (OUTPATIENT)
Dept: ORTHOPEDIC SURGERY | Facility: CLINIC | Age: 65
End: 2025-02-24
Payer: MEDICARE

## 2025-02-24 ENCOUNTER — APPOINTMENT (OUTPATIENT)
Dept: MRI IMAGING | Facility: CLINIC | Age: 65
End: 2025-02-24
Payer: MEDICARE

## 2025-02-24 VITALS — HEIGHT: 62 IN | BODY MASS INDEX: 31.65 KG/M2 | WEIGHT: 172 LBS

## 2025-02-24 DIAGNOSIS — M51.379 OTH INTVRT DISC DEGEN, LUMBOSACR W/O LUM BCK OR LW EXTRM PN: ICD-10-CM

## 2025-02-24 DIAGNOSIS — Z98.890 OTHER SPECIFIED POSTPROCEDURAL STATES: ICD-10-CM

## 2025-02-24 DIAGNOSIS — M54.16 RADICULOPATHY, LUMBAR REGION: ICD-10-CM

## 2025-02-24 DIAGNOSIS — M43.10 SPONDYLOLISTHESIS, SITE UNSPECIFIED: ICD-10-CM

## 2025-02-24 DIAGNOSIS — Z90.49 ACQUIRED ABSENCE OF OTHER SPECIFIED PARTS OF DIGESTIVE TRACT: Chronic | ICD-10-CM

## 2025-02-24 DIAGNOSIS — M51.379 OTHER INTERVERTEBRAL DISC DEGENERATION, LUMBOSACRAL REGION WITHOUT MENTION OF LUMBAR BACK PAIN OR LOWER EXTREMITY PAIN: ICD-10-CM

## 2025-02-24 DIAGNOSIS — M48.061 SPINAL STENOSIS, LUMBAR REGION WITHOUT NEUROGENIC CLAUDICATION: ICD-10-CM

## 2025-02-24 PROCEDURE — 72148 MRI LUMBAR SPINE W/O DYE: CPT

## 2025-02-24 PROCEDURE — 72148 MRI LUMBAR SPINE W/O DYE: CPT | Mod: 26

## 2025-02-24 PROCEDURE — 99213 OFFICE O/P EST LOW 20 MIN: CPT
